# Patient Record
Sex: MALE | Race: WHITE | NOT HISPANIC OR LATINO | Employment: OTHER | URBAN - METROPOLITAN AREA
[De-identification: names, ages, dates, MRNs, and addresses within clinical notes are randomized per-mention and may not be internally consistent; named-entity substitution may affect disease eponyms.]

---

## 2023-01-01 ENCOUNTER — HOSPITAL ENCOUNTER (INPATIENT)
Facility: HOSPITAL | Age: 63
LOS: 9 days | Discharge: HOME HEALTH | DRG: 280 | End: 2023-11-08
Attending: EMERGENCY MEDICINE | Admitting: INTERNAL MEDICINE
Payer: MEDICARE

## 2023-01-01 DIAGNOSIS — J18.9 PNEUMONIA OF BOTH LUNGS DUE TO INFECTIOUS ORGANISM, UNSPECIFIED PART OF LUNG: ICD-10-CM

## 2023-01-01 DIAGNOSIS — I51.3 LV (LEFT VENTRICULAR) MURAL THROMBUS: ICD-10-CM

## 2023-01-01 DIAGNOSIS — I50.9 CONGESTIVE HEART FAILURE, UNSPECIFIED HF CHRONICITY, UNSPECIFIED HEART FAILURE TYPE: ICD-10-CM

## 2023-01-01 DIAGNOSIS — K40.90 LEFT INGUINAL HERNIA: ICD-10-CM

## 2023-01-01 DIAGNOSIS — J18.9 COMMUNITY ACQUIRED PNEUMONIA, UNSPECIFIED LATERALITY: ICD-10-CM

## 2023-01-01 DIAGNOSIS — R06.02 SOB (SHORTNESS OF BREATH): ICD-10-CM

## 2023-01-01 DIAGNOSIS — K62.5 RECTAL BLEED: Primary | ICD-10-CM

## 2023-01-01 DIAGNOSIS — R06.02 SHORTNESS OF BREATH: ICD-10-CM

## 2023-01-01 DIAGNOSIS — R09.02 HYPOXIA: ICD-10-CM

## 2023-01-01 LAB
ALBUMIN SERPL-MCNC: 3.1 G/DL (ref 3.4–4.8)
ALBUMIN/GLOB SERPL: 1 RATIO (ref 1.1–2)
ALP SERPL-CCNC: 136 UNIT/L (ref 40–150)
ALT SERPL-CCNC: 122 UNIT/L (ref 0–55)
AST SERPL-CCNC: 82 UNIT/L (ref 5–34)
BASOPHILS # BLD AUTO: 0.06 X10(3)/MCL
BASOPHILS NFR BLD AUTO: 0.4 %
BILIRUB SERPL-MCNC: 1.4 MG/DL
BNP BLD-MCNC: 3544.2 PG/ML
BUN SERPL-MCNC: 38.6 MG/DL (ref 8.4–25.7)
CALCIUM SERPL-MCNC: 8.4 MG/DL (ref 8.8–10)
CHLORIDE SERPL-SCNC: 108 MMOL/L (ref 98–107)
CO2 SERPL-SCNC: 24 MMOL/L (ref 23–31)
CREAT SERPL-MCNC: 1.05 MG/DL (ref 0.73–1.18)
EOSINOPHIL # BLD AUTO: 0.04 X10(3)/MCL (ref 0–0.9)
EOSINOPHIL NFR BLD AUTO: 0.3 %
ERYTHROCYTE [DISTWIDTH] IN BLOOD BY AUTOMATED COUNT: 15.7 % (ref 11.5–17)
FLUAV AG UPPER RESP QL IA.RAPID: NOT DETECTED
FLUBV AG UPPER RESP QL IA.RAPID: NOT DETECTED
GFR SERPLBLD CREATININE-BSD FMLA CKD-EPI: >60 MLS/MIN/1.73/M2
GLOBULIN SER-MCNC: 3 GM/DL (ref 2.4–3.5)
GLUCOSE SERPL-MCNC: 97 MG/DL (ref 82–115)
HCT VFR BLD AUTO: 47.6 % (ref 42–52)
HGB BLD-MCNC: 15 G/DL (ref 14–18)
IMM GRANULOCYTES # BLD AUTO: 0.2 X10(3)/MCL (ref 0–0.04)
IMM GRANULOCYTES NFR BLD AUTO: 1.3 %
LACTATE SERPL-SCNC: 1.5 MMOL/L (ref 0.5–2.2)
LYMPHOCYTES # BLD AUTO: 0.61 X10(3)/MCL (ref 0.6–4.6)
LYMPHOCYTES NFR BLD AUTO: 4 %
MCH RBC QN AUTO: 29.4 PG (ref 27–31)
MCHC RBC AUTO-ENTMCNC: 31.5 G/DL (ref 33–36)
MCV RBC AUTO: 93.3 FL (ref 80–94)
MONOCYTES # BLD AUTO: 0.85 X10(3)/MCL (ref 0.1–1.3)
MONOCYTES NFR BLD AUTO: 5.6 %
NEUTROPHILS # BLD AUTO: 13.51 X10(3)/MCL (ref 2.1–9.2)
NEUTROPHILS NFR BLD AUTO: 88.4 %
NRBC BLD AUTO-RTO: 0.1 %
PLATELET # BLD AUTO: 129 X10(3)/MCL (ref 130–400)
PMV BLD AUTO: 12 FL (ref 7.4–10.4)
POTASSIUM SERPL-SCNC: 4 MMOL/L (ref 3.5–5.1)
PROT SERPL-MCNC: 6.1 GM/DL (ref 5.8–7.6)
RBC # BLD AUTO: 5.1 X10(6)/MCL (ref 4.7–6.1)
RSV A 5' UTR RNA NPH QL NAA+PROBE: NOT DETECTED
SARS-COV-2 RNA RESP QL NAA+PROBE: NOT DETECTED
SODIUM SERPL-SCNC: 141 MMOL/L (ref 136–145)
TROPONIN I SERPL-MCNC: 0.05 NG/ML (ref 0–0.04)
TROPONIN I SERPL-MCNC: 0.09 NG/ML (ref 0–0.04)
WBC # SPEC AUTO: 15.27 X10(3)/MCL (ref 4.5–11.5)

## 2023-01-01 PROCEDURE — 85025 COMPLETE CBC W/AUTO DIFF WBC: CPT | Performed by: NURSE PRACTITIONER

## 2023-01-01 PROCEDURE — 63600175 PHARM REV CODE 636 W HCPCS: Performed by: EMERGENCY MEDICINE

## 2023-01-01 PROCEDURE — 84484 ASSAY OF TROPONIN QUANT: CPT | Performed by: INTERNAL MEDICINE

## 2023-01-01 PROCEDURE — 96365 THER/PROPH/DIAG IV INF INIT: CPT

## 2023-01-01 PROCEDURE — 83880 ASSAY OF NATRIURETIC PEPTIDE: CPT | Performed by: NURSE PRACTITIONER

## 2023-01-01 PROCEDURE — 63600175 PHARM REV CODE 636 W HCPCS: Performed by: INTERNAL MEDICINE

## 2023-01-01 PROCEDURE — 93005 ELECTROCARDIOGRAM TRACING: CPT

## 2023-01-01 PROCEDURE — 83605 ASSAY OF LACTIC ACID: CPT | Performed by: NURSE PRACTITIONER

## 2023-01-01 PROCEDURE — 0241U COVID/RSV/FLU A&B PCR: CPT | Performed by: EMERGENCY MEDICINE

## 2023-01-01 PROCEDURE — 25000003 PHARM REV CODE 250: Performed by: INTERNAL MEDICINE

## 2023-01-01 PROCEDURE — 25000003 PHARM REV CODE 250: Performed by: NURSE PRACTITIONER

## 2023-01-01 PROCEDURE — 93010 EKG 12-LEAD: ICD-10-PCS | Mod: ,,, | Performed by: STUDENT IN AN ORGANIZED HEALTH CARE EDUCATION/TRAINING PROGRAM

## 2023-01-01 PROCEDURE — 80053 COMPREHEN METABOLIC PANEL: CPT | Performed by: NURSE PRACTITIONER

## 2023-01-01 PROCEDURE — 11000001 HC ACUTE MED/SURG PRIVATE ROOM

## 2023-01-01 PROCEDURE — 93010 ELECTROCARDIOGRAM REPORT: CPT | Mod: ,,, | Performed by: STUDENT IN AN ORGANIZED HEALTH CARE EDUCATION/TRAINING PROGRAM

## 2023-01-01 PROCEDURE — 84484 ASSAY OF TROPONIN QUANT: CPT | Performed by: NURSE PRACTITIONER

## 2023-01-01 PROCEDURE — 96372 THER/PROPH/DIAG INJ SC/IM: CPT | Performed by: NURSE PRACTITIONER

## 2023-01-01 PROCEDURE — 87040 BLOOD CULTURE FOR BACTERIA: CPT | Performed by: NURSE PRACTITIONER

## 2023-01-01 PROCEDURE — 63600175 PHARM REV CODE 636 W HCPCS: Performed by: NURSE PRACTITIONER

## 2023-01-01 PROCEDURE — 99285 EMERGENCY DEPT VISIT HI MDM: CPT | Mod: 25

## 2023-01-01 PROCEDURE — 25000003 PHARM REV CODE 250: Performed by: EMERGENCY MEDICINE

## 2023-01-01 RX ORDER — SODIUM CHLORIDE 0.9 % (FLUSH) 0.9 %
10 SYRINGE (ML) INJECTION
Status: DISCONTINUED | OUTPATIENT
Start: 2023-01-01 | End: 2023-11-08 | Stop reason: HOSPADM

## 2023-01-01 RX ORDER — ACETAMINOPHEN 325 MG/1
650 TABLET ORAL EVERY 8 HOURS PRN
Status: DISCONTINUED | OUTPATIENT
Start: 2023-01-01 | End: 2023-11-08 | Stop reason: HOSPADM

## 2023-01-01 RX ORDER — TALC
6 POWDER (GRAM) TOPICAL NIGHTLY PRN
Status: DISCONTINUED | OUTPATIENT
Start: 2023-01-01 | End: 2023-11-08 | Stop reason: HOSPADM

## 2023-01-01 RX ORDER — ENOXAPARIN SODIUM 100 MG/ML
40 INJECTION SUBCUTANEOUS EVERY 24 HOURS
Status: DISCONTINUED | OUTPATIENT
Start: 2023-10-31 | End: 2023-10-31

## 2023-01-01 RX ORDER — ENOXAPARIN SODIUM 100 MG/ML
1 INJECTION SUBCUTANEOUS ONCE
Status: COMPLETED | OUTPATIENT
Start: 2023-01-01 | End: 2023-01-01

## 2023-01-01 RX ORDER — TALC
6 POWDER (GRAM) TOPICAL NIGHTLY PRN
Status: DISCONTINUED | OUTPATIENT
Start: 2023-01-01 | End: 2023-01-01

## 2023-01-01 RX ORDER — FUROSEMIDE 10 MG/ML
40 INJECTION INTRAMUSCULAR; INTRAVENOUS
Status: DISCONTINUED | OUTPATIENT
Start: 2023-10-31 | End: 2023-11-02

## 2023-01-01 RX ORDER — HYDROCODONE BITARTRATE AND ACETAMINOPHEN 10; 325 MG/1; MG/1
1 TABLET ORAL
Status: COMPLETED | OUTPATIENT
Start: 2023-01-01 | End: 2023-01-01

## 2023-01-01 RX ORDER — ASPIRIN 325 MG
325 TABLET, DELAYED RELEASE (ENTERIC COATED) ORAL ONCE
Status: COMPLETED | OUTPATIENT
Start: 2023-01-01 | End: 2023-01-01

## 2023-01-01 RX ORDER — IPRATROPIUM BROMIDE AND ALBUTEROL SULFATE 2.5; .5 MG/3ML; MG/3ML
3 SOLUTION RESPIRATORY (INHALATION) EVERY 4 HOURS PRN
Status: DISCONTINUED | OUTPATIENT
Start: 2023-10-31 | End: 2023-11-08 | Stop reason: HOSPADM

## 2023-01-01 RX ADMIN — ASPIRIN 325 MG: 325 TABLET, COATED ORAL at 06:10

## 2023-01-01 RX ADMIN — ENOXAPARIN SODIUM 70 MG: 80 INJECTION SUBCUTANEOUS at 06:10

## 2023-01-01 RX ADMIN — AZITHROMYCIN MONOHYDRATE 500 MG: 500 INJECTION, POWDER, LYOPHILIZED, FOR SOLUTION INTRAVENOUS at 09:10

## 2023-01-01 RX ADMIN — HYDROCODONE BITARTRATE AND ACETAMINOPHEN 1 TABLET: 10; 325 TABLET ORAL at 06:10

## 2023-01-01 RX ADMIN — PIPERACILLIN AND TAZOBACTAM 4.5 G: 4; .5 INJECTION, POWDER, LYOPHILIZED, FOR SOLUTION INTRAVENOUS; PARENTERAL at 05:10

## 2023-10-30 PROBLEM — J18.9 CAP (COMMUNITY ACQUIRED PNEUMONIA): Status: ACTIVE | Noted: 2023-01-01

## 2023-10-30 NOTE — ED PROVIDER NOTES
Encounter Date: 10/30/2023    SCRIBE #1 NOTE: I, Jess Solano, am scribing for, and in the presence of,  Campbell Wilkins MD. I have scribed the following portions of the note - Other sections scribed: HPI, ROS, PE.       History     Chief Complaint   Patient presents with    Shortness of Breath     AASi with SOB, weakness. Recent admission for bilat pneumonia and COPD exacerbation. Left AMA      63 year old male with history of COPD, MI, and lung cancer presents to the ED via EMS for SOB. Per EMS, pt was admitted to a hospital in [place] for pneumonia and left AMA. Pt reports that he was still feeling bad when he left 2 days ago. He complains of groin pain due to a hernia and a cough. He notes that he is on oxygen at home.     The history is provided by the patient and the EMS personnel. No  was used.     Review of patient's allergies indicates:  Not on File  History reviewed. No pertinent past medical history.  History reviewed. No pertinent surgical history.  History reviewed. No pertinent family history.     Review of Systems   Respiratory:  Positive for cough and shortness of breath.    Musculoskeletal:         Groin pain       Physical Exam     Initial Vitals [10/30/23 1400]   BP Pulse Resp Temp SpO2   (!) 158/108 (!) 113 (!) 23 97.5 °F (36.4 °C) 95 %      MAP       --         Physical Exam    Nursing note and vitals reviewed.  Constitutional: He appears distressed (moderately).   Ill appearing    HENT:   Head: Normocephalic and atraumatic.   Right Ear: Tympanic membrane normal.   Left Ear: Tympanic membrane normal.   Mouth/Throat: Oropharynx is clear and moist.   Eyes: Conjunctivae and EOM are normal. Pupils are equal, round, and reactive to light.   Neck: Trachea normal. Neck supple. Carotid bruit is not present. No JVD present.   Normal range of motion.  Cardiovascular:  Normal rate and regular rhythm.           No murmur heard.  Pulmonary/Chest: No respiratory distress. He exhibits  no tenderness.   Bilateral crackles in all fields    Abdominal: Abdomen is soft. Bowel sounds are normal. He exhibits no distension.   Soft, mildly tender left inguinal hernia   Colostomy right abdominal   Musculoskeletal:         General: Edema (1+ LE bilaterally) present. Normal range of motion.      Cervical back: Normal range of motion and neck supple.      Lumbar back: Normal. No tenderness. Normal range of motion.     Neurological: He is alert and oriented to person, place, and time. He has normal strength. No cranial nerve deficit or sensory deficit.   Psychiatric: He has a normal mood and affect. Judgment normal.         ED Course   Procedures  Labs Reviewed   COMPREHENSIVE METABOLIC PANEL - Abnormal; Notable for the following components:       Result Value    Chloride 108 (*)     Blood Urea Nitrogen 38.6 (*)     Calcium Level Total 8.4 (*)     Albumin Level 3.1 (*)     Albumin/Globulin Ratio 1.0 (*)     Alanine Aminotransferase 122 (*)     Aspartate Aminotransferase 82 (*)     All other components within normal limits   B-TYPE NATRIURETIC PEPTIDE - Abnormal; Notable for the following components:    Natriuretic Peptide 3,544.2 (*)     All other components within normal limits   TROPONIN I - Abnormal; Notable for the following components:    Troponin-I 0.085 (*)     All other components within normal limits   CBC WITH DIFFERENTIAL - Abnormal; Notable for the following components:    WBC 15.27 (*)     MCHC 31.5 (*)     Platelet 129 (*)     MPV 12.0 (*)     Neut # 13.51 (*)     IG# 0.20 (*)     All other components within normal limits   LACTIC ACID, PLASMA - Normal   BLOOD CULTURE [facility]   BLOOD CULTURE [facility]   CBC W/ AUTO DIFFERENTIAL    Narrative:     The following orders were created for panel order CBC Auto Differential.  Procedure                               Abnormality         Status                     ---------                               -----------         ------                     CBC with  Differential[[id number]]       Abnormal            Final result                 Please view results for these tests on the individual orders.   COVID/RSV/FLU A&B PCR     EKG Readings: (Independently Interpreted)   Initial Reading: No STEMI. Rhythm: Sinus Tachycardia. Heart Rate: 110. Ectopy: No Ectopy. Conduction: Normal. T Waves: Normal. Axis: Left Axis Deviation. Clinical Impression: Sinus Tachycardia and Left Ventricular Hypertrophy (LDH) Other Impression: nonspecific ST changes   EKG performed at 14:10 on 10/30/23       Imaging Results              X-Ray Chest PA And Lateral (Final result)  Result time 10/30/23 16:52:57      Final result by Azeem Stanley MD (10/30/23 16:52:57)                   Narrative:    EXAMINATION  XR CHEST PA AND LATERAL    CLINICAL HISTORY  Shortness of breath;    TECHNIQUE  A total of 2 images submitted of the chest.    COMPARISON  None available at the time of initial interpretation.    FINDINGS  Lines/tubes/devices: Right chest wall subcutaneous port is present, catheter terminating at the upper to mid SVC region.  Single lead left chest wall pacemaker/ICD also noted.  Multiple ECG leads overlie the chest.    The cardiomediastinal silhouette and central pulmonary vasculature are unremarkable contour and size.  The trachea is midline.  Ill-defined hazy opacification of the bilateral mid and lower lung zones is appreciated, with no organized lobar consolidation identified.  Small right pleural effusion is suspected.  There is no convincing pneumothorax.    There is no acute osseous or extrathoracic abnormality.    IMPRESSION  1. Ill-defined bilateral mid/lower zone hazy infiltrates, developing atypical infectious process not excluded.  2. Suspected small right pleural effusion.      Electronically signed by: Azeem Stanley  Date:    10/30/2023  Time:    16:52                                     Medications   HYDROcodone-acetaminophen  mg per tablet 1 tablet (has no administration  in time range)   piperacillin-tazobactam (ZOSYN) 4.5 g in dextrose 5 % in water (D5W) 100 mL IVPB (MB+) (4.5 g Intravenous New Bag 10/30/23 1700)     Medical Decision Making  Differential diagnosis includes but is not limited to pneumonia, COPD, sepsis, CHF, inguinal hernia, incarcerated inguinal hernia       Amount and/or Complexity of Data Reviewed  Independent Historian: EMS     Details: Per EMS, pt was admitted to a hospital in [place] for pneumonia and left AMA.  External Data Reviewed: labs and notes.     Details: Please see prior notes from [facility] as described in my notes    Patient had elevated lactic acids, mildly elevated white blood cell count 59115, negative viral swabs, elevated BNP and troponin with a previous hospitalization this prior  Labs: ordered.     Details: Elevated white blood cell count 03220, BNP of 3000, elevated troponin  Radiology: ordered. Decision-making details documented in ED Course.  ECG/medicine tests: ordered and independent interpretation performed. Decision-making details documented in ED Course.  Discussion of management or test interpretation with external provider(s): Discussed with Cecilia with hospitalist and will admit    Risk  Prescription drug management.  Decision regarding hospitalization.              Attending Attestation:           Physician Attestation for Scribe:  Physician Attestation Statement for Scribe #1: I, Campbell Wilkins MD, reviewed documentation, as scribed by Jess Solano in my presence, and it is both accurate and complete.             ED Course as of 10/30/23 1732   Mon Oct 30, 2023   1656 She recently another facility AMA.  No fever, mild leukocytosis as well.  Patient however with significantly elevated BNP and troponin.  Review of old chart shows that the patient has a terrible cardiomyopathy, with EF less than 20% looking at outside notes. [MP]   1726 Received old records from [place].  Patient with productive green sputum for  several days, COVID swabs flu swabs negative.  White count was 19485 at the time.  Had elevated troponin and BNP at that time as well [MP]      ED Course User Index  [MP] Campbell Wilkins MD                    Clinical Impression:   Final diagnoses:  [R06.02] Shortness of breath  [J18.9] Pneumonia of both lungs due to infectious organism, unspecified part of lung (Primary)  [I50.9] Congestive heart failure, unspecified HF chronicity, unspecified heart failure type  [R09.02] Hypoxia  [K40.90] Left inguinal hernia        ED Disposition Condition    Admit Stable                Campbell Wilkins MD  10/30/23 1731       Campbell Wilkins MD  10/30/23 1732

## 2023-10-30 NOTE — FIRST PROVIDER EVALUATION
Medical screening examination initiated.  I have conducted a focused provider triage encounter, findings are as follows:    Brief history of present illness:  Patient states SOB. Patient was recently admitted for pneumonia, COPD, and sepsis but he left AMA.    Vitals:    10/30/23 1400   BP: (!) 158/108   Pulse: (!) 113   Resp: (!) 23   Temp: 97.5 °F (36.4 °C)   TempSrc: Oral   SpO2: 95%       Pertinent physical exam:  Awake, alert    Brief workup plan:  Labs, EKG, Imaging    Preliminary workup initiated; this workup will be continued and followed by the physician or advanced practice provider that is assigned to the patient when roomed.

## 2023-10-31 LAB
ALBUMIN SERPL-MCNC: 2.8 G/DL (ref 3.4–4.8)
ALBUMIN/GLOB SERPL: 0.9 RATIO (ref 1.1–2)
ALP SERPL-CCNC: 114 UNIT/L (ref 40–150)
ALT SERPL-CCNC: 99 UNIT/L (ref 0–55)
APTT PPP: 29.7 SECONDS (ref 23.2–33.7)
AST SERPL-CCNC: 62 UNIT/L (ref 5–34)
AV INDEX (PROSTH): 0.66
AV MEAN GRADIENT: 2 MMHG
AV PEAK GRADIENT: 4 MMHG
AV VALVE AREA BY VELOCITY RATIO: 2.05 CM²
AV VALVE AREA: 1.88 CM²
AV VELOCITY RATIO: 0.72
BASOPHILS # BLD AUTO: 0.05 X10(3)/MCL
BASOPHILS # BLD AUTO: 0.07 X10(3)/MCL
BASOPHILS NFR BLD AUTO: 0.3 %
BASOPHILS NFR BLD AUTO: 0.4 %
BILIRUB SERPL-MCNC: 1.3 MG/DL
BSA FOR ECHO PROCEDURE: 1.82 M2
BUN SERPL-MCNC: 33.1 MG/DL (ref 8.4–25.7)
CALCIUM SERPL-MCNC: 8.6 MG/DL (ref 8.8–10)
CHLORIDE SERPL-SCNC: 107 MMOL/L (ref 98–107)
CO2 SERPL-SCNC: 28 MMOL/L (ref 23–31)
CREAT SERPL-MCNC: 1.1 MG/DL (ref 0.73–1.18)
CV ECHO LV RWT: 0.28 CM
DOP CALC AO PEAK VEL: 0.97 M/S
DOP CALC AO VTI: 14.6 CM
DOP CALC LVOT AREA: 2.8 CM2
DOP CALC LVOT DIAMETER: 1.9 CM
DOP CALC LVOT PEAK VEL: 0.7 M/S
DOP CALC LVOT STROKE VOLUME: 27.49 CM3
DOP CALC MV VTI: 11.1 CM
DOP CALCLVOT PEAK VEL VTI: 9.7 CM
E WAVE DECELERATION TIME: 119 MSEC
E/A RATIO: 1.03
E/E' RATIO: 13.6 M/S
ECHO LV POSTERIOR WALL: 0.89 CM (ref 0.6–1.1)
EOSINOPHIL # BLD AUTO: 0.09 X10(3)/MCL (ref 0–0.9)
EOSINOPHIL # BLD AUTO: 0.16 X10(3)/MCL (ref 0–0.9)
EOSINOPHIL NFR BLD AUTO: 0.5 %
EOSINOPHIL NFR BLD AUTO: 1 %
ERYTHROCYTE [DISTWIDTH] IN BLOOD BY AUTOMATED COUNT: 15.6 % (ref 11.5–17)
ERYTHROCYTE [DISTWIDTH] IN BLOOD BY AUTOMATED COUNT: 15.8 % (ref 11.5–17)
FRACTIONAL SHORTENING: 14 % (ref 28–44)
GFR SERPLBLD CREATININE-BSD FMLA CKD-EPI: >60 MLS/MIN/1.73/M2
GLOBULIN SER-MCNC: 3.2 GM/DL (ref 2.4–3.5)
GLUCOSE SERPL-MCNC: 103 MG/DL (ref 82–115)
HCT VFR BLD AUTO: 48 % (ref 42–52)
HCT VFR BLD AUTO: 49.4 % (ref 42–52)
HGB BLD-MCNC: 15.4 G/DL (ref 14–18)
HGB BLD-MCNC: 15.6 G/DL (ref 14–18)
IMM GRANULOCYTES # BLD AUTO: 0.16 X10(3)/MCL (ref 0–0.04)
IMM GRANULOCYTES # BLD AUTO: 0.18 X10(3)/MCL (ref 0–0.04)
IMM GRANULOCYTES NFR BLD AUTO: 1 %
IMM GRANULOCYTES NFR BLD AUTO: 1.1 %
INR PPP: 1.2
INTERVENTRICULAR SEPTUM: 0.81 CM (ref 0.6–1.1)
LEFT ATRIUM SIZE: 4 CM
LEFT ATRIUM VOLUME INDEX MOD: 45.2 ML/M2
LEFT ATRIUM VOLUME MOD: 82.8 CM3
LEFT INTERNAL DIMENSION IN SYSTOLE: 5.57 CM (ref 2.1–4)
LEFT VENTRICLE DIASTOLIC VOLUME INDEX: 115.3 ML/M2
LEFT VENTRICLE DIASTOLIC VOLUME: 211 ML
LEFT VENTRICLE MASS INDEX: 124 G/M2
LEFT VENTRICLE SYSTOLIC VOLUME INDEX: 83.1 ML/M2
LEFT VENTRICLE SYSTOLIC VOLUME: 152 ML
LEFT VENTRICULAR INTERNAL DIMENSION IN DIASTOLE: 6.44 CM (ref 3.5–6)
LEFT VENTRICULAR MASS: 227.12 G
LV LATERAL E/E' RATIO: 11.33 M/S
LV SEPTAL E/E' RATIO: 17 M/S
LVOT MG: 1 MMHG
LVOT MV: 0.44 CM/S
LYMPHOCYTES # BLD AUTO: 0.76 X10(3)/MCL (ref 0.6–4.6)
LYMPHOCYTES # BLD AUTO: 1.03 X10(3)/MCL (ref 0.6–4.6)
LYMPHOCYTES NFR BLD AUTO: 4.6 %
LYMPHOCYTES NFR BLD AUTO: 6.1 %
MCH RBC QN AUTO: 29.5 PG (ref 27–31)
MCH RBC QN AUTO: 29.7 PG (ref 27–31)
MCHC RBC AUTO-ENTMCNC: 31.6 G/DL (ref 33–36)
MCHC RBC AUTO-ENTMCNC: 32.1 G/DL (ref 33–36)
MCV RBC AUTO: 92.5 FL (ref 80–94)
MCV RBC AUTO: 93.4 FL (ref 80–94)
MONOCYTES # BLD AUTO: 1.06 X10(3)/MCL (ref 0.1–1.3)
MONOCYTES # BLD AUTO: 1.07 X10(3)/MCL (ref 0.1–1.3)
MONOCYTES NFR BLD AUTO: 6.4 %
MONOCYTES NFR BLD AUTO: 6.5 %
MRSA PCR SCRN (OHS): NOT DETECTED
MV MEAN GRADIENT: 2 MMHG
MV PEAK A VEL: 0.66 M/S
MV PEAK E VEL: 0.68 M/S
MV PEAK GRADIENT: 3 MMHG
MV VALVE AREA BY CONTINUITY EQUATION: 2.48 CM2
NEUTROPHILS # BLD AUTO: 14.24 X10(3)/MCL (ref 2.1–9.2)
NEUTROPHILS # BLD AUTO: 14.3 X10(3)/MCL (ref 2.1–9.2)
NEUTROPHILS NFR BLD AUTO: 85.1 %
NEUTROPHILS NFR BLD AUTO: 87 %
NRBC BLD AUTO-RTO: 0 %
NRBC BLD AUTO-RTO: 0.1 %
OHS LV EJECTION FRACTION SIMPSONS BIPLANE MOD: 17 %
PISA TR MAX VEL: 3.25 M/S
PLATELET # BLD AUTO: 136 X10(3)/MCL (ref 130–400)
PLATELET # BLD AUTO: 136 X10(3)/MCL (ref 130–400)
PMV BLD AUTO: 11.8 FL (ref 7.4–10.4)
PMV BLD AUTO: 12 FL (ref 7.4–10.4)
POTASSIUM SERPL-SCNC: 4.3 MMOL/L (ref 3.5–5.1)
PROT SERPL-MCNC: 6 GM/DL (ref 5.8–7.6)
PROTHROMBIN TIME: 15.5 SECONDS (ref 12.5–14.5)
RA MAJOR: 4.76 CM
RA WIDTH: 4.54 CM
RBC # BLD AUTO: 5.19 X10(6)/MCL (ref 4.7–6.1)
RBC # BLD AUTO: 5.29 X10(6)/MCL (ref 4.7–6.1)
RIGHT VENTRICULAR END-DIASTOLIC DIMENSION: 3.35 CM
RV MID DIAMA: 4.25 CM
SODIUM SERPL-SCNC: 144 MMOL/L (ref 136–145)
TDI LATERAL: 0.06 M/S
TDI SEPTAL: 0.04 M/S
TDI: 0.05 M/S
TR MAX PG: 42 MMHG
TRICUSPID ANNULAR PLANE SYSTOLIC EXCURSION: 1.74 CM
TROPONIN I SERPL-MCNC: 0.05 NG/ML (ref 0–0.04)
WBC # SPEC AUTO: 16.38 X10(3)/MCL (ref 4.5–11.5)
WBC # SPEC AUTO: 16.79 X10(3)/MCL (ref 4.5–11.5)
Z-SCORE OF LEFT VENTRICULAR DIMENSION IN END DIASTOLE: 2.39
Z-SCORE OF LEFT VENTRICULAR DIMENSION IN END SYSTOLE: 4.55

## 2023-10-31 PROCEDURE — 25000003 PHARM REV CODE 250: Performed by: INTERNAL MEDICINE

## 2023-10-31 PROCEDURE — 63600175 PHARM REV CODE 636 W HCPCS: Performed by: INTERNAL MEDICINE

## 2023-10-31 PROCEDURE — 85025 COMPLETE CBC W/AUTO DIFF WBC: CPT

## 2023-10-31 PROCEDURE — 85730 THROMBOPLASTIN TIME PARTIAL: CPT | Performed by: INTERNAL MEDICINE

## 2023-10-31 PROCEDURE — 21400001 HC TELEMETRY ROOM

## 2023-10-31 PROCEDURE — 84484 ASSAY OF TROPONIN QUANT: CPT | Performed by: INTERNAL MEDICINE

## 2023-10-31 PROCEDURE — 87641 MR-STAPH DNA AMP PROBE: CPT | Performed by: INTERNAL MEDICINE

## 2023-10-31 PROCEDURE — 85025 COMPLETE CBC W/AUTO DIFF WBC: CPT | Performed by: NURSE PRACTITIONER

## 2023-10-31 PROCEDURE — 80053 COMPREHEN METABOLIC PANEL: CPT | Performed by: NURSE PRACTITIONER

## 2023-10-31 PROCEDURE — 85610 PROTHROMBIN TIME: CPT

## 2023-10-31 PROCEDURE — 85730 THROMBOPLASTIN TIME PARTIAL: CPT

## 2023-10-31 PROCEDURE — 25000003 PHARM REV CODE 250: Performed by: HOSPITALIST

## 2023-10-31 PROCEDURE — 87070 CULTURE OTHR SPECIMN AEROBIC: CPT | Performed by: INTERNAL MEDICINE

## 2023-10-31 PROCEDURE — 63600175 PHARM REV CODE 636 W HCPCS

## 2023-10-31 RX ORDER — HEPARIN SODIUM,PORCINE/D5W 25000/250
0-40 INTRAVENOUS SOLUTION INTRAVENOUS CONTINUOUS
Status: DISCONTINUED | OUTPATIENT
Start: 2023-10-31 | End: 2023-11-01

## 2023-10-31 RX ORDER — HYDROCODONE BITARTRATE AND ACETAMINOPHEN 10; 325 MG/1; MG/1
1 TABLET ORAL EVERY 6 HOURS PRN
Status: DISCONTINUED | OUTPATIENT
Start: 2023-10-31 | End: 2023-11-08 | Stop reason: HOSPADM

## 2023-10-31 RX ADMIN — PIPERACILLIN AND TAZOBACTAM 4.5 G: 4; .5 INJECTION, POWDER, LYOPHILIZED, FOR SOLUTION INTRAVENOUS; PARENTERAL at 09:10

## 2023-10-31 RX ADMIN — FUROSEMIDE 40 MG: 10 INJECTION, SOLUTION INTRAMUSCULAR; INTRAVENOUS at 12:10

## 2023-10-31 RX ADMIN — HYDROCODONE BITARTRATE AND ACETAMINOPHEN 1 TABLET: 10; 325 TABLET ORAL at 05:10

## 2023-10-31 RX ADMIN — ACETAMINOPHEN 325MG 650 MG: 325 TABLET ORAL at 10:10

## 2023-10-31 RX ADMIN — AZITHROMYCIN MONOHYDRATE 500 MG: 500 INJECTION, POWDER, LYOPHILIZED, FOR SOLUTION INTRAVENOUS at 10:10

## 2023-10-31 RX ADMIN — HYDROCODONE BITARTRATE AND ACETAMINOPHEN 1 TABLET: 10; 325 TABLET ORAL at 10:10

## 2023-10-31 RX ADMIN — PIPERACILLIN AND TAZOBACTAM 4.5 G: 4; .5 INJECTION, POWDER, LYOPHILIZED, FOR SOLUTION INTRAVENOUS; PARENTERAL at 12:10

## 2023-10-31 RX ADMIN — HEPARIN SODIUM 18 UNITS/KG/HR: 10000 INJECTION, SOLUTION INTRAVENOUS at 04:10

## 2023-10-31 RX ADMIN — PIPERACILLIN AND TAZOBACTAM 4.5 G: 4; .5 INJECTION, POWDER, LYOPHILIZED, FOR SOLUTION INTRAVENOUS; PARENTERAL at 05:10

## 2023-10-31 RX ADMIN — ACETAMINOPHEN 650 MG: 325 TABLET, FILM COATED ORAL at 03:10

## 2023-10-31 NOTE — PROGRESS NOTES
[facility] Hospital Medicine Progress Note        Chief Complaint: Inpatient Follow-up     HPI:   63-year-old male with a history of CHF with EF less than 20% status post ICD, CAD status post PCI 2022, essential hypertension, COPD, lung cancer status post XRT, colon cancer status post colectomy who was recently admitted at [facility] for sepsis from community-acquired pneumonia but reported left 2 days ago against medical advice.  He presents to the ER tonight complaining of persistent dyspnea, cough with purulent green mucus production.  He also complains of left groin pain, has a chronic left groin hernia, but states it easily reducible and he has been told he is not a surgical candidate due to his comorbidities.     He arrived afebrile but tachycardic and mildly tachypneic, hemodynamically stable and requiring 4 L nasal cannula to maintain adequate sats.  Laboratory work showed leukocytosis of 15 K, mild transaminitis, a BNP of 3500 and a mildly elevated troponin of 0.085.  Influenza and COVID testing was negative.  Chest x-ray showed ill-defined bilateral mid/lower zone hazy infiltrates concerning for developing atypical process.    Interval Hx:  Patient patient is somnolent but arousable.  No family with him at bedside.  Vitals are stable.  Currently on 3 L nasal cannula.  Still little tachycardic.  Put out about 2 L of urine with diuretics.  Net negative 1.6    Objective/physical exam:  General: In no acute distress, afebrile  Chest: Clear to auscultation bilaterally  Heart: RRR, +S1, S2, no appreciable murmur  Abdomen: Soft, nontender, BS +  MSK: Warm, no lower extremity edema, no clubbing or cyanosis  Neurologic: Alert and oriented x4, Cranial nerve II-XII intact, Strength 5/5 in all 4 extremities    VITAL SIGNS: 24 HRS MIN & MAX LAST   Temp  Min: 97.5 °F (36.4 °C)  Max: 97.5 °F (36.4 °C) 97.5 °F (36.4 °C)   BP  Min: 107/71  Max: 158/108 131/87   Pulse  Min: 80  Max:  113  102   Resp  Min: 15  Max: 24 15   SpO2  Min: 94 %  Max: 100 % 96 %       Recent Labs   Lab 10/30/23  1514 10/31/23  0244   WBC 15.27* 16.38*   RBC 5.10 5.19   HGB 15.0 15.4   HCT 47.6 48.0   MCV 93.3 92.5   MCH 29.4 29.7   MCHC 31.5* 32.1*   RDW 15.7 15.6   * 136   MPV 12.0* 12.0*       Recent Labs   Lab 10/30/23  1514 10/31/23  0244    144   K 4.0 4.3   CO2 24 28   BUN 38.6* 33.1*   CREATININE 1.05 1.10   CALCIUM 8.4* 8.6*   ALBUMIN 3.1* 2.8*   ALKPHOS 136 114   * 99*   AST 82* 62*   BILITOT 1.4 1.3          Microbiology Results (last 7 days)       Procedure Component Value Units Date/Time    Respiratory Culture [[id number]] Collected: 10/31/23 0626    Order Status: Sent Specimen: Sputum, Expectorated Updated: 10/31/23 0629    Blood Culture [[id number]] Collected: 10/30/23 1514    Order Status: Resulted Specimen: Blood Updated: 10/30/23 1519    Blood Culture [[id number]] Collected: 10/30/23 1514    Order Status: Resulted Specimen: Blood Updated: 10/30/23 1519             Radiology:  X-Ray Chest PA And Lateral  EXAMINATION  XR CHEST PA AND LATERAL    CLINICAL HISTORY  Shortness of breath;    TECHNIQUE  A total of 2 images submitted of the chest.    COMPARISON  None available at the time of initial interpretation.    FINDINGS  Lines/tubes/devices: Right chest wall subcutaneous port is present, catheter terminating at the upper to mid SVC region.  Single lead left chest wall pacemaker/ICD also noted.  Multiple ECG leads overlie the chest.    The cardiomediastinal silhouette and central pulmonary vasculature are unremarkable contour and size.  The trachea is midline.  Ill-defined hazy opacification of the bilateral mid and lower lung zones is appreciated, with no organized lobar consolidation identified.  Small right pleural effusion is suspected.  There is no convincing pneumothorax.    There is no acute osseous or extrathoracic abnormality.    IMPRESSION  1. Ill-defined bilateral mid/lower  zone hazy infiltrates, developing atypical infectious process not excluded.  2. Suspected small right pleural effusion.    Electronically signed by: Azeem Stanley  Date:    10/30/2023  Time:    16:52      Scheduled Med:   azithromycin  500 mg Intravenous Q24H    enoxparin  40 mg Subcutaneous Daily    furosemide (LASIX) injection  40 mg Intravenous Q12H    piperacillin-tazobactam (Zosyn) IV (PEDS and ADULTS) (extended infusion is not appropriate)  4.5 g Intravenous Q8H        Continuous Infusions:       PRN Meds:  acetaminophen, acetaminophen, albuterol-ipratropium, melatonin, sodium chloride 0.9%, sodium chloride 0.9%       Assessment/Plan:   Multifactorial acute hypoxemic respiratory failure  Community-acquired pneumonia contributing to above  Decompensated systolic congestive CHF contributing to above  NSTEMI/type 2 demand ischemia 2/2 above  Left groin inguinal hernia  CHF, documented LVEF less than 20%, 10/2022 s/p ICD   CAD s/p PCI 10/2022  Essential hypertension   COPD, not on home oxygen   Lung cancer  Colon cancer, status post colectomy    Patient's hypoxemia likely secondary to heart failure exacerbation and community-acquired pneumonia.    Will continue with aggressive diuresis with Lasix.  He is had good urine output but still has peripheral edema.  Continue with 40 mg b.i.d. for now.    Continue to monitor his troponins.  No signs of acute coronary syndrome.  Likely some demand ischemia from heart failure and pneumonia.    Will continue with Zosyn for now.  Leukocytosis essentially unchanged from yesterday.  Remains afebrile.  Follow up blood cultures.    At baseline the patient does have COPD but does not require home O2.  Will continue try to wean his oxygen as tolerated.  Blood pressure stable.    Critical care diagnosis: acute systolic heart failure requiring IV diuretics  Critical care interventions: hands on evaluation, review of labs/radiographs/records and discussions with family  Critical care  time spent: >32 minutes        David Goldberg MD   10/31/2023     All diagnosis and differential diagnosis have been reviewed; assessment and plan has been documented; I have personally reviewed the labs and test results that are presently available; I have reviewed the patients medication list; I have reviewed the consulting providers response and recommendations. I have reviewed or attempted to review medical records based upon their availability    All of the patient's questions have been  addressed and answered. Patient's is agreeable to the above stated plan. I will continue to monitor closely and make adjustments to medical management as needed.  _____________________________________________________________________

## 2023-10-31 NOTE — CONSULTS
[facility] - Emergency Dept    Cardiology  Consult Note    Patient Name: Ye Vergara  MRN: [id number]  Admission Date: 10/30/2023  Hospital Length of Stay: 1 days  Code Status: Full Code   Attending Provider: Spencer Pratt MD   Consulting Provider: Alexia Montenegro MD  Primary Care Physician: Aaron Weeks MD  Principal Problem:CAP (community acquired pneumonia)    Patient information was obtained from patient, ER records, and primary team.     Subjective:     Chief Complaint:  dyspnea      HPI: Mr. Vergara is a 62 y/o male who initially presented to the ED 10/30/2023 with complaints of dyspnea on exertion and productive cough with green sputum.  Per chart review it appears patient was recently admitted at [facility] for sepsis from CAP but left AMA 2 days ago.  Lab work on admission was notable for leukocytosis with WBC 15, BNP 3500, elevated trop of 0.085.  CXR performed showing ill-defined bilateral mid/lower zone hazy infiltrates concerning for developing atypical process.  Patient has known history of HF with EF 20%.  Patient was given lasix 40mg BID with good urine output.  Cardiology was consulted in setting of CHF exacerbation and NSTEMI.      PMH: CHF w/ EF20% s/p ICD (March 2023), CAD s/p PCI 10/2022, HTN, HLD, colon cancer s/p colectomy, inguinal hernia, lung cancer s/p XRT (>10 years ago), COPD, hx of prostate cancer   PSH: colectomy, ICD placement  Family History: non-contributory   Social History: denies tobacco, alcohol, or recreational drug use.     Previous Cardiac Diagnostics:   NM Myocardial perfusion spect 10/9/2018  . No evidence of stress-induced ischemia.   2. Normal left ventricular systolic function with a left ventricular ejection fraction of 62%.    TTE 10/24/2022:  Left Ventricle: Left ventricle cavity appears normal. There is   hypertrophy. Systolic function is severely decreased with an ejection   fraction less than 20%. Wall motion is abnormal. Unable to  assess   diastolic function.     Left Atrium: Left atrium size is normal.     Mitral Valve: Mildly thickened leaflets. No restricted motion. Moderate   transvalvular regurgitation. No stenosis. Tricuspid Valve: Valve structure   is normal. No restricted motion. Mild to moderate transvalvular   regurgitation. No stenosis.     Pericardium: Trivial pericardial effusion present. No indication of   cardiac tamponade     Cardiac catheterization 10/26/2022  Findings:   Hemodynamics:    LVEDP 33    Coronary findings:   Coronary dominance: right   LM   Li   LAD   Mild diffuse diseae   CFX  Mild diffuse disease   RCA  Patent mid stent . Moderate diffuse disease    History reviewed. No pertinent past medical history.    History reviewed. No pertinent surgical history.    Review of patient's allergies indicates:  No Known Allergies    No current facility-administered medications on file prior to encounter.     No current outpatient medications on file prior to encounter.     Family History    None       Tobacco Use    Smoking status: Not on file    Smokeless tobacco: Not on file   Substance and Sexual Activity    Alcohol use: Not on file    Drug use: Not on file    Sexual activity: Not on file       Review of Systems   Constitutional:  Negative for chills and fever.   Respiratory:  Positive for chest tightness and shortness of breath.    Gastrointestinal:  Positive for abdominal pain and nausea.   Genitourinary:  Positive for scrotal swelling.       Objective:     Vital Signs (Most Recent):  Temp: 97.5 °F (36.4 °C) (10/30/23 1400)  Pulse: 109 (10/31/23 0732)  Resp: 18 (10/31/23 0732)  BP: 112/67 (10/31/23 0732)  SpO2: (!) 94 % (10/31/23 0748) Vital Signs (24h Range):  Temp:  [97.5 °F (36.4 °C)] 97.5 °F (36.4 °C)  Pulse:  [] 109  Resp:  [15-24] 18  SpO2:  [83 %-100 %] 94 %  BP: (107-158)/() 112/67     Weight: 68.5 kg (151 lb)  Body mass index is 22.3 kg/m².    SpO2: (!) 94 %         Intake/Output Summary  (Last 24 hours) at 10/31/2023 1020  Last data filed at 10/31/2023 0751  Gross per 24 hour   Intake 300 ml   Output 2500 ml   Net -2200 ml       Lines/Drains/Airways       Drain  Duration                  Colostomy RLQ -- days              Peripheral Intravenous Line  Duration                  Peripheral IV - Single Lumen 10/31/23 0747 20 G Left Antecubital <1 day                    Significant Labs:  Recent Results (from the past 72 hour(s))   Comprehensive Metabolic Panel    Collection Time: 10/30/23  3:14 PM   Result Value Ref Range    Sodium Level 141 136 - 145 mmol/L    Potassium Level 4.0 3.5 - 5.1 mmol/L    Chloride 108 (H) 98 - 107 mmol/L    Carbon Dioxide 24 23 - 31 mmol/L    Glucose Level 97 82 - 115 mg/dL    Blood Urea Nitrogen 38.6 (H) 8.4 - 25.7 mg/dL    Creatinine 1.05 0.73 - 1.18 mg/dL    Calcium Level Total 8.4 (L) 8.8 - 10.0 mg/dL    Protein Total 6.1 5.8 - 7.6 gm/dL    Albumin Level 3.1 (L) 3.4 - 4.8 g/dL    Globulin 3.0 2.4 - 3.5 gm/dL    Albumin/Globulin Ratio 1.0 (L) 1.1 - 2.0 ratio    Bilirubin Total 1.4 <=1.5 mg/dL    Alkaline Phosphatase 136 40 - 150 unit/L    Alanine Aminotransferase 122 (H) 0 - 55 unit/L    Aspartate Aminotransferase 82 (H) 5 - 34 unit/L    eGFR >60 mls/min/1.73/m2   BNP    Collection Time: 10/30/23  3:14 PM   Result Value Ref Range    Natriuretic Peptide 3,544.2 (H) <=100.0 pg/mL   Troponin I    Collection Time: 10/30/23  3:14 PM   Result Value Ref Range    Troponin-I 0.085 (H) 0.000 - 0.045 ng/mL   Lactic Acid, Plasma    Collection Time: 10/30/23  3:14 PM   Result Value Ref Range    Lactic Acid Level 1.5 0.5 - 2.2 mmol/L   CBC with Differential    Collection Time: 10/30/23  3:14 PM   Result Value Ref Range    WBC 15.27 (H) 4.50 - 11.50 x10(3)/mcL    RBC 5.10 4.70 - 6.10 x10(6)/mcL    Hgb 15.0 14.0 - 18.0 g/dL    Hct 47.6 42.0 - 52.0 %    MCV 93.3 80.0 - 94.0 fL    MCH 29.4 27.0 - 31.0 pg    MCHC 31.5 (L) 33.0 - 36.0 g/dL    RDW 15.7 11.5 - 17.0 %    Platelet 129 (L) 130 -  400 x10(3)/mcL    MPV 12.0 (H) 7.4 - 10.4 fL    Neut % 88.4 %    Lymph % 4.0 %    Mono % 5.6 %    Eos % 0.3 %    Basophil % 0.4 %    Lymph # 0.61 0.6 - 4.6 x10(3)/mcL    Neut # 13.51 (H) 2.1 - 9.2 x10(3)/mcL    Mono # 0.85 0.1 - 1.3 x10(3)/mcL    Eos # 0.04 0 - 0.9 x10(3)/mcL    Baso # 0.06 <=0.2 x10(3)/mcL    IG# 0.20 (H) 0 - 0.04 x10(3)/mcL    IG% 1.3 %    NRBC% 0.1 %   COVID/RSV/FLU A&B PCR    Collection Time: 10/30/23  5:21 PM   Result Value Ref Range    Influenza A PCR Not Detected Not Detected    Influenza B PCR Not Detected Not Detected    Respiratory Syncytial Virus PCR Not Detected Not Detected    SARS-CoV-2 PCR Not Detected Not Detected, Negative, Invalid   Troponin I    Collection Time: 10/30/23  8:43 PM   Result Value Ref Range    Troponin-I 0.047 (H) 0.000 - 0.045 ng/mL   Comprehensive Metabolic Panel    Collection Time: 10/31/23  2:44 AM   Result Value Ref Range    Sodium Level 144 136 - 145 mmol/L    Potassium Level 4.3 3.5 - 5.1 mmol/L    Chloride 107 98 - 107 mmol/L    Carbon Dioxide 28 23 - 31 mmol/L    Glucose Level 103 82 - 115 mg/dL    Blood Urea Nitrogen 33.1 (H) 8.4 - 25.7 mg/dL    Creatinine 1.10 0.73 - 1.18 mg/dL    Calcium Level Total 8.6 (L) 8.8 - 10.0 mg/dL    Protein Total 6.0 5.8 - 7.6 gm/dL    Albumin Level 2.8 (L) 3.4 - 4.8 g/dL    Globulin 3.2 2.4 - 3.5 gm/dL    Albumin/Globulin Ratio 0.9 (L) 1.1 - 2.0 ratio    Bilirubin Total 1.3 <=1.5 mg/dL    Alkaline Phosphatase 114 40 - 150 unit/L    Alanine Aminotransferase 99 (H) 0 - 55 unit/L    Aspartate Aminotransferase 62 (H) 5 - 34 unit/L    eGFR >60 mls/min/1.73/m2   Troponin I    Collection Time: 10/31/23  2:44 AM   Result Value Ref Range    Troponin-I 0.046 (H) 0.000 - 0.045 ng/mL   CBC with Differential    Collection Time: 10/31/23  2:44 AM   Result Value Ref Range    WBC 16.38 (H) 4.50 - 11.50 x10(3)/mcL    RBC 5.19 4.70 - 6.10 x10(6)/mcL    Hgb 15.4 14.0 - 18.0 g/dL    Hct 48.0 42.0 - 52.0 %    MCV 92.5 80.0 - 94.0 fL    MCH 29.7  27.0 - 31.0 pg    MCHC 32.1 (L) 33.0 - 36.0 g/dL    RDW 15.6 11.5 - 17.0 %    Platelet 136 130 - 400 x10(3)/mcL    MPV 12.0 (H) 7.4 - 10.4 fL    Neut % 87.0 %    Lymph % 4.6 %    Mono % 6.5 %    Eos % 0.5 %    Basophil % 0.4 %    Lymph # 0.76 0.6 - 4.6 x10(3)/mcL    Neut # 14.24 (H) 2.1 - 9.2 x10(3)/mcL    Mono # 1.06 0.1 - 1.3 x10(3)/mcL    Eos # 0.09 0 - 0.9 x10(3)/mcL    Baso # 0.07 <=0.2 x10(3)/mcL    IG# 0.16 (H) 0 - 0.04 x10(3)/mcL    IG% 1.0 %    NRBC% 0.1 %   Respiratory Culture    Collection Time: 10/31/23  6:26 AM    Specimen: Sputum, Expectorated   Result Value Ref Range    GRAM STAIN Quality 2+     GRAM STAIN Few Gram positive cocci     GRAM STAIN Few Gram Positive Rods     GRAM STAIN Few Yeast    MRSA PCR    Collection Time: 10/31/23  6:26 AM   Result Value Ref Range    MRSA PCR SCRN ([facility]) Not Detected Not Detected       Significant Imaging:  Imaging Results              X-Ray Chest PA And Lateral (Final result)  Result time 10/30/23 16:52:57      Final result by Azeem Stanley MD (10/30/23 16:52:57)                   Narrative:    EXAMINATION  XR CHEST PA AND LATERAL    CLINICAL HISTORY  Shortness of breath;    TECHNIQUE  A total of 2 images submitted of the chest.    COMPARISON  None available at the time of initial interpretation.    FINDINGS  Lines/tubes/devices: Right chest wall subcutaneous port is present, catheter terminating at the upper to mid SVC region.  Single lead left chest wall pacemaker/ICD also noted.  Multiple ECG leads overlie the chest.    The cardiomediastinal silhouette and central pulmonary vasculature are unremarkable contour and size.  The trachea is midline.  Ill-defined hazy opacification of the bilateral mid and lower lung zones is appreciated, with no organized lobar consolidation identified.  Small right pleural effusion is suspected.  There is no convincing pneumothorax.    There is no acute osseous or extrathoracic abnormality.    IMPRESSION  1. Ill-defined bilateral  mid/lower zone hazy infiltrates, developing atypical infectious process not excluded.  2. Suspected small right pleural effusion.      Electronically signed by: Azeem Stanley  Date:    10/30/2023  Time:    16:52                                    EKG:    Results for orders placed or performed during the hospital encounter of 10/30/23   EKG 12-lead    Narrative    Test Reason : R06.02,    Vent. Rate : 110 BPM     Atrial Rate : 110 BPM     P-R Int : 154 ms          QRS Dur : 096 ms      QT Int : 338 ms       P-R-T Axes : 086 -40 122 degrees     QTc Int : 457 ms    Sinus tachycardia  Possible Left atrial enlargement  Left axis deviation  Minimal voltage criteria for LVH, may be normal variant ( Steptoe product )  Septal infarct ,age undetermined  ST and T wave abnormality, consider lateral ischemia  Abnormal ECG  No previous ECGs available  Confirmed by Baldo Forbes MD ([id number]) on 10/31/2023 12:40:36 AM    Referred By:             Confirmed By:Baldo Forbes MD     Physical Exam  Constitutional:       Appearance: Normal appearance. He is ill-appearing.   HENT:      Head: Normocephalic and atraumatic.   Eyes:      Extraocular Movements: Extraocular movements intact.      Pupils: Pupils are equal, round, and reactive to light.   Cardiovascular:      Rate and Rhythm: Normal rate and regular rhythm.      Pulses: Normal pulses.      Heart sounds: Normal heart sounds.   Pulmonary:      Effort: Pulmonary effort is normal. No respiratory distress.      Breath sounds: Normal breath sounds.   Abdominal:      General: There is no distension.      Palpations: Abdomen is soft.   Genitourinary:     Comments: Large hernia noted in left inguinal region   Musculoskeletal:         General: Normal range of motion.      Right lower leg: No edema.      Left lower leg: No edema.   Neurological:      Mental Status: He is alert.         Home Medications:   No current facility-administered medications on file prior to encounter.     No current  outpatient medications on file prior to encounter.       Current Inpatient Medications:    Current Facility-Administered Medications:     acetaminophen tablet 650 mg, 650 mg, Oral, Q8H PRN, Yovani Darling MD, 650 mg at 10/31/23 1018    acetaminophen tablet 650 mg, 650 mg, Oral, Q8H PRN, Yovani Darling MD, 650 mg at 10/31/23 0306    albuterol-ipratropium 2.5 mg-0.5 mg/3 mL nebulizer solution 3 mL, 3 mL, Nebulization, Q4H PRN, Yovani Darling MD    azithromycin 500 mg in dextrose 5 % 250 mL IVPB (ready to mix), 500 mg, Intravenous, Q24H, Yovani Darling MD, Stopped at 10/30/23 2208    enoxaparin injection 40 mg, 40 mg, Subcutaneous, Daily, Yovani Darling MD    furosemide injection 40 mg, 40 mg, Intravenous, Q12H, Yovani Darling MD, 40 mg at 10/31/23 0018    melatonin tablet 6 mg, 6 mg, Oral, Nightly PRN, Yovani Darling MD    piperacillin-tazobactam (ZOSYN) 4.5 g in dextrose 5 % in water (D5W) 100 mL IVPB (MB+), 4.5 g, Intravenous, Q8H, Yovani Darling MD, Last Rate: 25 mL/hr at 10/31/23 0955, 4.5 g at 10/31/23 0955    sodium chloride 0.9% flush 10 mL, 10 mL, Intravenous, PRN, Yovani Darling MD    sodium chloride 0.9% flush 10 mL, 10 mL, Intravenous, PRN, Yovani Darling MD  No current outpatient medications on file.         VTE Risk Mitigation (From admission, onward)           Ordered     enoxaparin injection 40 mg  Daily         10/30/23 2050     Place sequential compression device  Until discontinued         10/30/23 2049     IP VTE HIGH RISK PATIENT  Once         10/30/23 2050     Place sequential compression device  Until discontinued         10/30/23 2050                    Assessment:     Acute hypoxemic respiratory failure  Community-acquired pneumonia   Decompensated systolic congestive CHF   NSTEMI/type 2 demand ischemia 2/2 above  Left groin inguinal hernia  CHF, documented LVEF less than 20%, 10/2022 s/p ICD   CAD s/p PCI  10/2022  Essential hypertension   COPD, not on home oxygen   Lung cancer s/p XRT   Colon cancer, status post colectomy    Plan:     -Echo reviewed showing EF 15-20% w/ LV thrombus and diastolic dysfunction   -initiating patient on heparin gtt for continued treatment, will likely need to be transitioned to Coumadin during stay   -continue lasix 40mg BID, good urine output noted   -other care per primary team     Thank you for your consult.     Alexia Montenegro MD  Cardiology  [facility] - Emergency Dept  10/31/2023 10:20 AM     Attending physician note  I have rounded with medical resident. I personally reviewed case related differential diagnoses, assessment and plan. A thorough physical examination noted above is performed by me. I have personally reviewed the labs, test results and medication lists that are currently available.  See above MDM formulated by me (Spencer Pratt MD):     Start heparin for LV thrombus.  We will also start Coumadin in a.m. goal INR 2.0-3.0.  Continue Lasix    All of the patient's and/or family's questions have been addressed and answered to the best of my ability.

## 2023-10-31 NOTE — NURSING
Nurses Note -- 4 Eyes      10/31/2023   3:39 PM      Skin assessed during: Admit      [x] No Altered Skin Integrity Present    []Prevention Measures Documented      [] Yes- Altered Skin Integrity Present or Discovered   [] LDA Added if Not in Epic (Describe Wound)   [] New Altered Skin Integrity was Present on Admit and Documented in LDA   [] Wound Image Taken    Wound Care Consulted? No    Attending Nurse:  Taty Singh RN/Staff Member:   Marli

## 2023-10-31 NOTE — H&P
[facility] Hospital Medicine History & Physical Examination       Patient Name: Ye Vergara  MRN: [id number]  Patient Class: Emergency   Admission Date: 10/30/2023  2:16 PM  Length of Stay: 0  Admitting Service: Hospital Medicine   Attending Physician: Yovani Darling MD   Primary Care Provider: Aaron Weeks MD  History source: EMR, patient and/or patient's family    CHIEF COMPLAINT   Dyspnea    HISTORY OF PRESENT ILLNESS:   Patient is a 63-year-old male with a history of CHF with EF less than 20% status post ICD, CAD status post PCI 2022, essential hypertension, COPD, lung cancer status post XRT, colon cancer status post colectomy who was recently admitted at [facility] for sepsis from community-acquired pneumonia but reported left 2 days ago against medical advice.  He presents to the ER tonight complaining of persistent dyspnea, cough with purulent green mucus production.  He also complains of left groin pain, has a chronic left groin hernia, but states it easily reducible and he has been told he is not a surgical candidate due to his comorbidities.    He arrived afebrile but tachycardic and mildly tachypneic, hemodynamically stable and requiring 4 L nasal cannula to maintain adequate sats.  Laboratory work showed leukocytosis of 15 K, mild transaminitis, a BNP of 3500 and a mildly elevated troponin of 0.085.  Influenza and COVID testing was negative.  Chest x-ray showed ill-defined bilateral mid/lower zone hazy infiltrates concerning for developing atypical process.    PAST MEDICAL HISTORY:   CHF, documented LVEF less than 20%, 10/2022 s/p ICD   CAD s/p PCI 10/2022  Essential hypertension   COPD, not on home 02  Lung cancer, s/p chemorXRT (>10 yrs ago)   Colon cancer, status post colectomy (>10 yers ago)   Hx of prostate cancer    PAST SURGICAL HISTORY:   Colectomy   ICD placement    ALLERGIES:   Patient has no known allergies.    FAMILY HISTORY:   Reviewed  "and non-contributory     SOCIAL HISTORY:   Denies current drug or EtOH use    HOME MEDICATIONS:     Prior to Admission medications    Not on File       REVIEW OF SYSTEMS:   Except as documented, all other systems reviewed and negative     PHYSICAL EXAM:   T 97.5 °F (36.4 °C)   /89   P 108   RR 19   O2 96 %  GENERAL: awake, alert, oriented and in no acute distress, non-toxic appearing   HEENT: normocephalic atraumatic   NECK: supple   LUNGS:  Sounds with bilateral rhonchi, no accessory muscle use   CVS: Regular rate and rhythm, normal peripheral perfusion  ABD: Soft, non-tender, non-distended, bowel sounds present  EXTREMITIES: no clubbing or cyanosis  SKIN: Warm, dry.   NEURO: alert and oriented, grossly without focal deficits   PSYCHIATRIC: Cooperative    LABS AND IMAGING:     Recent Labs     10/30/23  1514   WBC 15.27*   RBC 5.10   HGB 15.0   HCT 47.6   MCV 93.3   MCH 29.4   MCHC 31.5*   RDW 15.7   *     Recent Labs     10/30/23  1514   LACTIC 1.5     No results for input(s): "INR", "APTT", "D-DIMER" in the last 72 hours.  No results for input(s): "HGBA1C", "CHOL", "TRIG", "LDL", "VLDL", "HDL" in the last 72 hours.   Recent Labs     10/30/23  1514      K 4.0   CHLORIDE 108*   CO2 24   BUN 38.6*   CREATININE 1.05   GLUCOSE 97   CALCIUM 8.4*   ALBUMIN 3.1*   GLOBULIN 3.0   ALKPHOS 136   *   AST 82*   BILITOT 1.4     Recent Labs     10/30/23  1514   BNP 3,544.2*   TROPONINI 0.085*          X-Ray Chest PA And Lateral  EXAMINATION  XR CHEST PA AND LATERAL    CLINICAL HISTORY  Shortness of breath;    TECHNIQUE  A total of 2 images submitted of the chest.    COMPARISON  None available at the time of initial interpretation.    FINDINGS  Lines/tubes/devices: Right chest wall subcutaneous port is present, catheter terminating at the upper to mid SVC region.  Single lead left chest wall pacemaker/ICD also noted.  Multiple ECG leads overlie the chest.    The cardiomediastinal silhouette and central " pulmonary vasculature are unremarkable contour and size.  The trachea is midline.  Ill-defined hazy opacification of the bilateral mid and lower lung zones is appreciated, with no organized lobar consolidation identified.  Small right pleural effusion is suspected.  There is no convincing pneumothorax.    There is no acute osseous or extrathoracic abnormality.    IMPRESSION  1. Ill-defined bilateral mid/lower zone hazy infiltrates, developing atypical infectious process not excluded.  2. Suspected small right pleural effusion.    Electronically signed by: Azeem Stanley  Date:    10/30/2023  Time:    16:52      ASSESSMENT & PLAN:   Multifactorial acute hypoxemic respiratory failure  Community-acquired pneumonia contributing to above  Decompensated systolic congestive CHF contributing to above  NSTEMI/type 2 demand ischemia 2/2 above  Left groin inguinal hernia  CHF, documented LVEF less than 20%, 10/2022 s/p ICD   CAD s/p PCI 10/2022  Essential hypertension   COPD, not on home oxygen   Lung cancer  Colon cancer, status post colectomy    - blood cultures, respiratory PCR and culture  - continue broad-spectrum empiric antibiotics   - nebs as needed  - IV Lasix, strict in's and out's   - tele monitoring, echocardiogram, trend cardiac enzymes   - resume home medications pending med rec    DVT prophylaxis: lovenox  Code status: full     If patient was admitted under observational status it is with my approval/permission.     At least 55 min was spent on this history and physical.  Time seen: 9PM 10/30/23  Critical care time = 35 min; Critical care diagnosis = hypoxia   Yovani Darling MD

## 2023-11-01 PROBLEM — E43 SEVERE MALNUTRITION: Status: RESOLVED | Noted: 2023-11-01 | Resolved: 2023-11-01

## 2023-11-01 PROBLEM — E43 SEVERE MALNUTRITION: Status: ACTIVE | Noted: 2023-11-01

## 2023-11-01 PROBLEM — E44.0 MODERATE MALNUTRITION: Status: ACTIVE | Noted: 2023-11-01

## 2023-11-01 LAB
ANION GAP SERPL CALC-SCNC: 12 MEQ/L
APTT PPP: 63.7 SECONDS (ref 23.2–33.7)
APTT PPP: 64.7 SECONDS (ref 23.2–33.7)
APTT PPP: 81.3 SECONDS (ref 23.2–33.7)
APTT PPP: 85.5 SECONDS (ref 23.2–33.7)
BASOPHILS # BLD AUTO: 0.08 X10(3)/MCL
BASOPHILS NFR BLD AUTO: 0.4 %
BUN SERPL-MCNC: 26.7 MG/DL (ref 8.4–25.7)
CALCIUM SERPL-MCNC: 7.9 MG/DL (ref 8.8–10)
CHLORIDE SERPL-SCNC: 100 MMOL/L (ref 98–107)
CO2 SERPL-SCNC: 27 MMOL/L (ref 23–31)
CREAT SERPL-MCNC: 0.87 MG/DL (ref 0.73–1.18)
CREAT/UREA NIT SERPL: 31
EOSINOPHIL # BLD AUTO: 0.26 X10(3)/MCL (ref 0–0.9)
EOSINOPHIL NFR BLD AUTO: 1.2 %
ERYTHROCYTE [DISTWIDTH] IN BLOOD BY AUTOMATED COUNT: 15.4 % (ref 11.5–17)
GFR SERPLBLD CREATININE-BSD FMLA CKD-EPI: >60 MLS/MIN/1.73/M2
GLUCOSE SERPL-MCNC: 99 MG/DL (ref 82–115)
HCT VFR BLD AUTO: 44.7 % (ref 42–52)
HCT VFR BLD AUTO: 48.5 % (ref 42–52)
HGB BLD-MCNC: 14.2 G/DL (ref 14–18)
HGB BLD-MCNC: 15.4 G/DL (ref 14–18)
IMM GRANULOCYTES # BLD AUTO: 0.32 X10(3)/MCL (ref 0–0.04)
IMM GRANULOCYTES NFR BLD AUTO: 1.4 %
LYMPHOCYTES # BLD AUTO: 1.42 X10(3)/MCL (ref 0.6–4.6)
LYMPHOCYTES NFR BLD AUTO: 6.4 %
MAGNESIUM SERPL-MCNC: 1.3 MG/DL (ref 1.6–2.6)
MCH RBC QN AUTO: 29.3 PG (ref 27–31)
MCHC RBC AUTO-ENTMCNC: 31.8 G/DL (ref 33–36)
MCV RBC AUTO: 92.4 FL (ref 80–94)
MONOCYTES # BLD AUTO: 1.77 X10(3)/MCL (ref 0.1–1.3)
MONOCYTES NFR BLD AUTO: 8 %
NEUTROPHILS # BLD AUTO: 18.23 X10(3)/MCL (ref 2.1–9.2)
NEUTROPHILS NFR BLD AUTO: 82.6 %
NRBC BLD AUTO-RTO: 0 %
PLATELET # BLD AUTO: 126 X10(3)/MCL (ref 130–400)
PLATELETS.RETICULATED NFR BLD AUTO: 15.3 % (ref 0.9–11.2)
PMV BLD AUTO: 12.3 FL (ref 7.4–10.4)
POTASSIUM SERPL-SCNC: 3.4 MMOL/L (ref 3.5–5.1)
RBC # BLD AUTO: 5.25 X10(6)/MCL (ref 4.7–6.1)
SODIUM SERPL-SCNC: 139 MMOL/L (ref 136–145)
WBC # SPEC AUTO: 22.08 X10(3)/MCL (ref 4.5–11.5)

## 2023-11-01 PROCEDURE — 99900035 HC TECH TIME PER 15 MIN (STAT)

## 2023-11-01 PROCEDURE — 25500020 PHARM REV CODE 255: Performed by: HOSPITALIST

## 2023-11-01 PROCEDURE — 25000003 PHARM REV CODE 250: Performed by: HOSPITALIST

## 2023-11-01 PROCEDURE — 85730 THROMBOPLASTIN TIME PARTIAL: CPT | Performed by: HOSPITALIST

## 2023-11-01 PROCEDURE — 27000221 HC OXYGEN, UP TO 24 HOURS

## 2023-11-01 PROCEDURE — 63600175 PHARM REV CODE 636 W HCPCS: Performed by: INTERNAL MEDICINE

## 2023-11-01 PROCEDURE — 63600175 PHARM REV CODE 636 W HCPCS

## 2023-11-01 PROCEDURE — 85014 HEMATOCRIT: CPT | Performed by: NURSE PRACTITIONER

## 2023-11-01 PROCEDURE — 80048 BASIC METABOLIC PNL TOTAL CA: CPT | Performed by: HOSPITALIST

## 2023-11-01 PROCEDURE — 83735 ASSAY OF MAGNESIUM: CPT | Performed by: HOSPITALIST

## 2023-11-01 PROCEDURE — 85730 THROMBOPLASTIN TIME PARTIAL: CPT | Performed by: INTERNAL MEDICINE

## 2023-11-01 PROCEDURE — 63600175 PHARM REV CODE 636 W HCPCS: Performed by: HOSPITALIST

## 2023-11-01 PROCEDURE — 85025 COMPLETE CBC W/AUTO DIFF WBC: CPT | Performed by: HOSPITALIST

## 2023-11-01 PROCEDURE — 25000003 PHARM REV CODE 250: Performed by: INTERNAL MEDICINE

## 2023-11-01 PROCEDURE — 21400001 HC TELEMETRY ROOM

## 2023-11-01 PROCEDURE — 94761 N-INVAS EAR/PLS OXIMETRY MLT: CPT

## 2023-11-01 PROCEDURE — 25000003 PHARM REV CODE 250

## 2023-11-01 RX ORDER — CARVEDILOL 3.12 MG/1
3.12 TABLET ORAL 2 TIMES DAILY
Status: DISCONTINUED | OUTPATIENT
Start: 2023-11-01 | End: 2023-11-02

## 2023-11-01 RX ORDER — WARFARIN SODIUM 5 MG/1
5 TABLET ORAL DAILY
Status: DISCONTINUED | OUTPATIENT
Start: 2023-11-01 | End: 2023-11-01

## 2023-11-01 RX ORDER — MAGNESIUM SULFATE HEPTAHYDRATE 40 MG/ML
2 INJECTION, SOLUTION INTRAVENOUS ONCE
Status: COMPLETED | OUTPATIENT
Start: 2023-11-01 | End: 2023-11-01

## 2023-11-01 RX ORDER — POTASSIUM CHLORIDE 7.45 MG/ML
40 INJECTION INTRAVENOUS ONCE
Status: COMPLETED | OUTPATIENT
Start: 2023-11-01 | End: 2023-11-01

## 2023-11-01 RX ADMIN — WARFARIN SODIUM 5 MG: 5 TABLET ORAL at 04:11

## 2023-11-01 RX ADMIN — HEPARIN SODIUM 18 UNITS/KG/HR: 10000 INJECTION, SOLUTION INTRAVENOUS at 08:11

## 2023-11-01 RX ADMIN — FUROSEMIDE 40 MG: 10 INJECTION, SOLUTION INTRAMUSCULAR; INTRAVENOUS at 05:11

## 2023-11-01 RX ADMIN — PIPERACILLIN AND TAZOBACTAM 4.5 G: 4; .5 INJECTION, POWDER, LYOPHILIZED, FOR SOLUTION INTRAVENOUS; PARENTERAL at 04:11

## 2023-11-01 RX ADMIN — FUROSEMIDE 40 MG: 10 INJECTION, SOLUTION INTRAMUSCULAR; INTRAVENOUS at 11:11

## 2023-11-01 RX ADMIN — PIPERACILLIN AND TAZOBACTAM 4.5 G: 4; .5 INJECTION, POWDER, LYOPHILIZED, FOR SOLUTION INTRAVENOUS; PARENTERAL at 01:11

## 2023-11-01 RX ADMIN — AZITHROMYCIN MONOHYDRATE 500 MG: 500 INJECTION, POWDER, LYOPHILIZED, FOR SOLUTION INTRAVENOUS at 11:11

## 2023-11-01 RX ADMIN — HYDROCODONE BITARTRATE AND ACETAMINOPHEN 1 TABLET: 10; 325 TABLET ORAL at 08:11

## 2023-11-01 RX ADMIN — MAGNESIUM SULFATE IN WATER 2 G: 40 INJECTION, SOLUTION INTRAVENOUS at 09:11

## 2023-11-01 RX ADMIN — IOPAMIDOL 100 ML: 755 INJECTION, SOLUTION INTRAVENOUS at 10:11

## 2023-11-01 RX ADMIN — PIPERACILLIN AND TAZOBACTAM 4.5 G: 4; .5 INJECTION, POWDER, LYOPHILIZED, FOR SOLUTION INTRAVENOUS; PARENTERAL at 11:11

## 2023-11-01 RX ADMIN — POTASSIUM CHLORIDE 40 MEQ: 7.46 INJECTION, SOLUTION INTRAVENOUS at 08:11

## 2023-11-01 RX ADMIN — CARVEDILOL 3.12 MG: 3.12 TABLET, FILM COATED ORAL at 11:11

## 2023-11-01 RX ADMIN — HYDROCODONE BITARTRATE AND ACETAMINOPHEN 1 TABLET: 10; 325 TABLET ORAL at 06:11

## 2023-11-01 NOTE — PROGRESS NOTES
[facility] Hospital Medicine Progress Note        Chief Complaint: Inpatient Follow-up     HPI:   63-year-old male with a history of CHF with EF less than 20% status post ICD, CAD status post PCI 2022, essential hypertension, COPD, lung cancer status post XRT, colon cancer status post colectomy who was recently admitted at [facility] for sepsis from community-acquired pneumonia but reported left 2 days ago against medical advice.  He presents to the ER tonight complaining of persistent dyspnea, cough with purulent green mucus production.  He also complains of left groin pain, has a chronic left groin hernia, but states it easily reducible and he has been told he is not a surgical candidate due to his comorbidities.     He arrived afebrile but tachycardic and mildly tachypneic, hemodynamically stable and requiring 4 L nasal cannula to maintain adequate sats.  Laboratory work showed leukocytosis of 15 K, mild transaminitis, a BNP of 3500 and a mildly elevated troponin of 0.085.  Influenza and COVID testing was negative.  Chest x-ray showed ill-defined bilateral mid/lower zone hazy infiltrates concerning for developing atypical process.  Echocardiogram revealed LV thrombus.  Cardiology started on heparin drip with Coumadin.  Will need lifelong Coumadin and follow up.      Interval Hx:  Patient resting comfortably in bed.  Currently on 2 L nasal cannula.  He is had very good urine output with diuretics.  Remains on heparin drip.  Asking for a Villagomez due to excessive urine output     Objective/physical exam:  General: In no acute distress, afebrile  Chest: Clear to auscultation bilaterally  Heart: RRR, +S1, S2, no appreciable murmur  Abdomen: Soft, nontender, BS +  MSK: Warm, no lower extremity edema, no clubbing or cyanosis  Neurologic: Alert and oriented x4, Cranial nerve II-XII intact, Strength 5/5 in all 4 extremities     VITAL SIGNS: 24 HRS MIN & MAX LAST   Temp  Min: 97.3 °F  (36.3 °C)  Max: 97.5 °F (36.4 °C) 97.4 °F (36.3 °C)   BP  Min: 103/71  Max: 131/90 115/69   Pulse  Min: 86  Max: 109  97   Resp  Min: 13  Max: 21 20   SpO2  Min: 83 %  Max: 99 % 96 %       Recent Labs   Lab 10/31/23  0244 10/31/23  1539 11/01/23 0419   WBC 16.38* 16.79* 22.08*   RBC 5.19 5.29 5.25   HGB 15.4 15.6 15.4   HCT 48.0 49.4 48.5   MCV 92.5 93.4 92.4   MCH 29.7 29.5 29.3   MCHC 32.1* 31.6* 31.8*   RDW 15.6 15.8 15.4    136 126*   MPV 12.0* 11.8* 12.3*       Recent Labs   Lab 10/30/23  1514 10/31/23  0244 11/01/23  0419    144 139   K 4.0 4.3 3.4*   CO2 24 28 27   BUN 38.6* 33.1* 26.7*   CREATININE 1.05 1.10 0.87   CALCIUM 8.4* 8.6* 7.9*   MG  --   --  1.30*   ALBUMIN 3.1* 2.8*  --    ALKPHOS 136 114  --    * 99*  --    AST 82* 62*  --    BILITOT 1.4 1.3  --           Microbiology Results (last 7 days)       Procedure Component Value Units Date/Time    Blood Culture [[id number]]  (Normal) Collected: 10/30/23 1514    Order Status: Completed Specimen: Blood Updated: 10/31/23 1601     CULTURE, BLOOD ([facility]) No Growth At 24 Hours    Blood Culture [[id number]]  (Normal) Collected: 10/30/23 1514    Order Status: Completed Specimen: Blood Updated: 10/31/23 1601     CULTURE, BLOOD ([facility]) No Growth At 24 Hours    Respiratory Culture [[id number]] Collected: 10/31/23 0626    Order Status: Completed Specimen: Sputum, Expectorated Updated: 10/31/23 0848     GRAM STAIN Quality 2+      Few Gram positive cocci      Few Gram Positive Rods      Few Yeast             Radiology:  Echo    Left Ventricle: The left ventricle is severely dilated. Severe global   hypokinesis present. There is severely reduced systolic function with a   visually estimated ejection fraction of 15 - 20%. There are mural thrombus   located on the anterior lateral and inferior, apical inferior. Grade III   diastolic dysfunction.    Right Ventricle: Systolic function is borderline low.    Left Atrium: Left atrium is moderately dilated.     Mitral Valve: There is mild mitral annular calcification present. There   is moderate regurgitation.    Tricuspid Valve: There is mild to moderate regurgitation. The estimated   PA systolic pressure is at least 52 mmHg.    Pulmonary Artery: There is moderate pulmonary hypertension.      Scheduled Med:   azithromycin  500 mg Intravenous Q24H    furosemide (LASIX) injection  40 mg Intravenous Q12H    piperacillin-tazobactam (Zosyn) IV (PEDS and ADULTS) (extended infusion is not appropriate)  4.5 g Intravenous Q8H        Continuous Infusions:   heparin (porcine) in D5W 18 Units/kg/hr (10/31/23 1639)        PRN Meds:  acetaminophen, acetaminophen, albuterol-ipratropium, heparin (PORCINE), heparin (PORCINE), HYDROcodone-acetaminophen, melatonin, sodium chloride 0.9%, sodium chloride 0.9%       Assessment/Plan:   Multifactorial acute hypoxemic respiratory failure  Community-acquired pneumonia contributing to above  Decompensated systolic congestive CHF contributing to above  LV thrombus  NSTEMI/type 2 demand ischemia 2/2 above  Left groin inguinal hernia  CHF, documented LVEF less than 20%, 10/2022 s/p ICD   CAD s/p PCI 10/2022  Essential hypertension   COPD, not on home oxygen   Lung cancer  Colon cancer, status post colectomy    Patient did have a bump in his white count this morning but he was on appropriate antimicrobials.  Continue with Zosyn and azithromycin, day 3.  Repeat labs tomorrow morning.  He was afebrile.  Sputum culture with multiple organisms.  This will likely end up being normal lashell.  Blood cultures negative so far.  He remains on IV Lasix 40 mg b.i.d..  Cardiology following for diuresis.  He has a low potassium and Mag this morning which I will place.  Renal function is at baseline.  Patient started on heparin drip for LV thrombus.  Also needs initiation on Coumadin.  Will defer to Cardiology for starting dose.  Would like to get him evaluated by PT and OT.  Still little tenuous at this point.  Will  reassess tomorrow.    Critical care diagnosis: acute systolic heart failure requiring IV diuretics  Critical care interventions: hands on evaluation, review of labs/radiographs/records and discussions with family  Critical care time spent: >32 minutes      David Goldberg MD   11/01/2023     All diagnosis and differential diagnosis have been reviewed; assessment and plan has been documented; I have personally reviewed the labs and test results that are presently available; I have reviewed the patients medication list; I have reviewed the consulting providers response and recommendations. I have reviewed or attempted to review medical records based upon their availability    All of the patient's questions have been  addressed and answered. Patient's is agreeable to the above stated plan. I will continue to monitor closely and make adjustments to medical management as needed.  _____________________________________________________________________

## 2023-11-01 NOTE — PLAN OF CARE
11/01/23 1221   Discharge Assessment   Assessment Type Discharge Planning Assessment   Confirmed/corrected address, phone number and insurance Yes   Confirmed Demographics Correct on Facesheet   Source of Information patient   Communicated ADILIA with patient/caregiver Date not available/Unable to determine   Reason For Admission Pneumonia   People in Home parent(s)   Do you expect to return to your current living situation? Yes   Current cognitive status: Alert/Oriented   Home Accessibility stairs to enter home   Number of Stairs, Main Entrance two   Stair Railings, Main Entrance railing on left side (ascending)   Equipment Currently Used at Home oxygen   Patient currently being followed by outpatient case management? No   Do you currently have service(s) that help you manage your care at home? No   Do you take prescription medications? Yes   Do you have prescription coverage? Yes   Coverage [employer]   Is the patient taking medications as prescribed? yes   Who is going to help you get home at discharge? Son or daughter   How do you get to doctors appointments? car, drives self   Are you on dialysis? No   Do you take coumadin? No   DME Needed Upon Discharge  other (see comments)  (To be determined)   Discharge Plan discussed with: Patient   Transition of Care Barriers None   Discharge Plan A Home   Discharge Plan B Home   Physical Activity   On average, how many days per week do you engage in moderate to strenuous exercise (like a brisk walk)? 0 days   On average, how many minutes do you engage in exercise at this level? 0 min   Financial Resource Strain   How hard is it for you to pay for the very basics like food, housing, medical care, and heating? Very Hard   Housing Stability   In the last 12 months, was there a time when you were not able to pay the mortgage or rent on time? N   In the last 12 months, how many places have you lived? 1   In the last 12 months, was there a time when you did not have a steady place  to sleep or slept in a shelter (including now)? N   Transportation Needs   In the past 12 months, has lack of transportation kept you from medical appointments or from getting medications? no   In the past 12 months, has lack of transportation kept you from meetings, work, or from getting things needed for daily living? No   Food Insecurity   Within the past 12 months, you worried that your food would run out before you got the money to buy more. Often true   Within the past 12 months, the food you bought just didn't last and you didn't have money to get more. Often true   Stress   Do you feel stress - tense, restless, nervous, or anxious, or unable to sleep at night because your mind is troubled all the time - these days? Very much   Social Connections   In a typical week, how many times do you talk on the phone with family, friends, or neighbors? Twice a week   How often do you get together with friends or relatives? Once   How often do you attend Episcopalian or [employer] services? Never   Do you belong to any clubs or organizations such as Ep[employer]op[employer] groups, unions, fraternal or athletic groups, or school groups? No   How often do you attend meetings of the clubs or organizations you belong to? Never   Are you , , , , never , or living with a partner?    Alcohol Use   Q1: How often do you have a drink containing alcohol? Monthly or l   Q2: How many drinks containing alcohol do you have on a typical day when you are drinking? 1 or 2   Q3: How often do you have six or more drinks on one occasion? Never   OTHER   Name(s) of People in Home Mother Carin

## 2023-11-01 NOTE — ASSESSMENT & PLAN NOTE
"Patient meets ASPEN criteria for moderate malnutrition of acute illness or injury per RD assessment as evidenced by:  Energy Intake (Malnutrition): less than or equal to 50% for greater than or equal to 5 days  Weight Loss (Malnutrition):  (does not meet criteria)  Subcutaneous Fat (Malnutrition): moderate depletion  Muscle Mass (Malnutrition): moderate depletion  Fluid Accumulation (Malnutrition): mild        A minimum of two characteristics is recommended for diagnosis of either severe or non-severe malnutrition.    Ht Readings from Last 1 Encounters:   10/31/23 5' 8.9" (1.75 m)     Wt Readings from Last 1 Encounters:   10/31/23 1537 73.4 kg (161 lb 13.1 oz)   10/31/23 1202 68.5 kg (151 lb)   10/30/23 1815 68.5 kg (151 lb)     Body mass index is 23.97 kg/m².    Patient has been screened and assessed by RD. See nutrition recommendations documented in inpatient nutrition assessment. RD will continue to follow patient throughout hospitalization.  "

## 2023-11-01 NOTE — PROGRESS NOTES
[facility] - 4th Floor Medical Telemetry    Cardiology  Progress Note    Patient Name: Ye Vergara  MRN: [id number]  Admission Date: 10/30/2023  Hospital Length of Stay: 2 days  Code Status: Full Code   Attending Physician: David Goldberg MD   Primary Care Physician: Aaron Weeks MD  Expected Discharge Date:   Principal Problem:CAP (community acquired pneumonia)    Subjective:     Brief HPI:   Mr. Vergara is a 62 y/o male who initially presented to the ED 10/30/2023 with complaints of dyspnea on exertion and productive cough with green sputum.  Per chart review it appears patient was recently admitted at [facility] for sepsis from CAP but left AMA 2 days ago.  Lab work on admission was notable for leukocytosis with WBC 15, BNP 3500, elevated trop of 0.085.  CXR performed showing ill-defined bilateral mid/lower zone hazy infiltrates concerning for developing atypical process.  Patient has known history of HF with EF 20%.  Patient was given lasix 40mg BID with good urine output.  Cardiology was consulted in setting of CHF exacerbation and NSTEMI.      Hospital Course:   Echo w/ EF 15-20% and LV mural thrombus noted - started on heparin gtt 10/31/2023 and coumadin on 11/1/2023 - goal INR 2-3  Urine output 4.325L over 24 hours  Remains on 4L NC   Resting comfortable in bed, denies any chest pain or palpitations.  Endorsing continued productive cough and shortness of breath although states it is improved since admission.  Continues to endorses significant pain to left inguinal region secondary to hernia.    No significant swelling noted to lower extremities, course breath sounds noted on auscultation of lungs with slight expiratory wheezing present.       Review of Systems   Cardiovascular:  Negative for chest pain, leg swelling and palpitations.   Respiratory:  Positive for cough, shortness of breath and sputum production.    Gastrointestinal:  Negative for nausea and vomiting.        Objective:     Vital Signs (Most Recent):  Temp: 97.4 °F (36.3 °C) (11/01/23 0824)  Pulse: 97 (11/01/23 0438)  Resp: 20 (11/01/23 0815)  BP: 95/64 (11/01/23 0824)  SpO2: 96 % (11/01/23 0438) Vital Signs (24h Range):  Temp:  [97.3 °F (36.3 °C)-97.5 °F (36.4 °C)] 97.4 °F (36.3 °C)  Pulse:  [] 97  Resp:  [13-21] 20  SpO2:  [96 %-99 %] 96 %  BP: ()/(64-90) 95/64     Weight: 73.4 kg (161 lb 13.1 oz)  Body mass index is 23.97 kg/m².    SpO2: 96 %         Intake/Output Summary (Last 24 hours) at 11/1/2023 0848  Last data filed at 11/1/2023 0711  Gross per 24 hour   Intake 480 ml   Output 4325 ml   Net -3845 ml       Lines/Drains/Airways       Drain  Duration                  Colostomy RLQ -- days              Peripheral Intravenous Line  Duration                  Peripheral IV - Single Lumen 10/31/23 0747 20 G Left Antecubital 1 day         Peripheral IV - Single Lumen 10/31/23 1736 20 G Anterior;Right Forearm <1 day                    Significant Labs:   Recent Results (from the past 72 hour(s))   Comprehensive Metabolic Panel    Collection Time: 10/30/23  3:14 PM   Result Value Ref Range    Sodium Level 141 136 - 145 mmol/L    Potassium Level 4.0 3.5 - 5.1 mmol/L    Chloride 108 (H) 98 - 107 mmol/L    Carbon Dioxide 24 23 - 31 mmol/L    Glucose Level 97 82 - 115 mg/dL    Blood Urea Nitrogen 38.6 (H) 8.4 - 25.7 mg/dL    Creatinine 1.05 0.73 - 1.18 mg/dL    Calcium Level Total 8.4 (L) 8.8 - 10.0 mg/dL    Protein Total 6.1 5.8 - 7.6 gm/dL    Albumin Level 3.1 (L) 3.4 - 4.8 g/dL    Globulin 3.0 2.4 - 3.5 gm/dL    Albumin/Globulin Ratio 1.0 (L) 1.1 - 2.0 ratio    Bilirubin Total 1.4 <=1.5 mg/dL    Alkaline Phosphatase 136 40 - 150 unit/L    Alanine Aminotransferase 122 (H) 0 - 55 unit/L    Aspartate Aminotransferase 82 (H) 5 - 34 unit/L    eGFR >60 mls/min/1.73/m2   BNP    Collection Time: 10/30/23  3:14 PM   Result Value Ref Range    Natriuretic Peptide 3,544.2 (H) <=100.0 pg/mL   Troponin I    Collection  Time: 10/30/23  3:14 PM   Result Value Ref Range    Troponin-I 0.085 (H) 0.000 - 0.045 ng/mL   Blood Culture    Collection Time: 10/30/23  3:14 PM    Specimen: Blood   Result Value Ref Range    CULTURE, BLOOD ([facility]) No Growth At 24 Hours    Blood Culture    Collection Time: 10/30/23  3:14 PM    Specimen: Blood   Result Value Ref Range    CULTURE, BLOOD ([facility]) No Growth At 24 Hours    Lactic Acid, Plasma    Collection Time: 10/30/23  3:14 PM   Result Value Ref Range    Lactic Acid Level 1.5 0.5 - 2.2 mmol/L   CBC with Differential    Collection Time: 10/30/23  3:14 PM   Result Value Ref Range    WBC 15.27 (H) 4.50 - 11.50 x10(3)/mcL    RBC 5.10 4.70 - 6.10 x10(6)/mcL    Hgb 15.0 14.0 - 18.0 g/dL    Hct 47.6 42.0 - 52.0 %    MCV 93.3 80.0 - 94.0 fL    MCH 29.4 27.0 - 31.0 pg    MCHC 31.5 (L) 33.0 - 36.0 g/dL    RDW 15.7 11.5 - 17.0 %    Platelet 129 (L) 130 - 400 x10(3)/mcL    MPV 12.0 (H) 7.4 - 10.4 fL    Neut % 88.4 %    Lymph % 4.0 %    Mono % 5.6 %    Eos % 0.3 %    Basophil % 0.4 %    Lymph # 0.61 0.6 - 4.6 x10(3)/mcL    Neut # 13.51 (H) 2.1 - 9.2 x10(3)/mcL    Mono # 0.85 0.1 - 1.3 x10(3)/mcL    Eos # 0.04 0 - 0.9 x10(3)/mcL    Baso # 0.06 <=0.2 x10(3)/mcL    IG# 0.20 (H) 0 - 0.04 x10(3)/mcL    IG% 1.3 %    NRBC% 0.1 %   COVID/RSV/FLU A&B PCR    Collection Time: 10/30/23  5:21 PM   Result Value Ref Range    Influenza A PCR Not Detected Not Detected    Influenza B PCR Not Detected Not Detected    Respiratory Syncytial Virus PCR Not Detected Not Detected    SARS-CoV-2 PCR Not Detected Not Detected, Negative, Invalid   Troponin I    Collection Time: 10/30/23  8:43 PM   Result Value Ref Range    Troponin-I 0.047 (H) 0.000 - 0.045 ng/mL   Comprehensive Metabolic Panel    Collection Time: 10/31/23  2:44 AM   Result Value Ref Range    Sodium Level 144 136 - 145 mmol/L    Potassium Level 4.3 3.5 - 5.1 mmol/L    Chloride 107 98 - 107 mmol/L    Carbon Dioxide 28 23 - 31 mmol/L    Glucose Level 103 82 - 115 mg/dL    Blood  Urea Nitrogen 33.1 (H) 8.4 - 25.7 mg/dL    Creatinine 1.10 0.73 - 1.18 mg/dL    Calcium Level Total 8.6 (L) 8.8 - 10.0 mg/dL    Protein Total 6.0 5.8 - 7.6 gm/dL    Albumin Level 2.8 (L) 3.4 - 4.8 g/dL    Globulin 3.2 2.4 - 3.5 gm/dL    Albumin/Globulin Ratio 0.9 (L) 1.1 - 2.0 ratio    Bilirubin Total 1.3 <=1.5 mg/dL    Alkaline Phosphatase 114 40 - 150 unit/L    Alanine Aminotransferase 99 (H) 0 - 55 unit/L    Aspartate Aminotransferase 62 (H) 5 - 34 unit/L    eGFR >60 mls/min/1.73/m2   Troponin I    Collection Time: 10/31/23  2:44 AM   Result Value Ref Range    Troponin-I 0.046 (H) 0.000 - 0.045 ng/mL   CBC with Differential    Collection Time: 10/31/23  2:44 AM   Result Value Ref Range    WBC 16.38 (H) 4.50 - 11.50 x10(3)/mcL    RBC 5.19 4.70 - 6.10 x10(6)/mcL    Hgb 15.4 14.0 - 18.0 g/dL    Hct 48.0 42.0 - 52.0 %    MCV 92.5 80.0 - 94.0 fL    MCH 29.7 27.0 - 31.0 pg    MCHC 32.1 (L) 33.0 - 36.0 g/dL    RDW 15.6 11.5 - 17.0 %    Platelet 136 130 - 400 x10(3)/mcL    MPV 12.0 (H) 7.4 - 10.4 fL    Neut % 87.0 %    Lymph % 4.6 %    Mono % 6.5 %    Eos % 0.5 %    Basophil % 0.4 %    Lymph # 0.76 0.6 - 4.6 x10(3)/mcL    Neut # 14.24 (H) 2.1 - 9.2 x10(3)/mcL    Mono # 1.06 0.1 - 1.3 x10(3)/mcL    Eos # 0.09 0 - 0.9 x10(3)/mcL    Baso # 0.07 <=0.2 x10(3)/mcL    IG# 0.16 (H) 0 - 0.04 x10(3)/mcL    IG% 1.0 %    NRBC% 0.1 %   Respiratory Culture    Collection Time: 10/31/23  6:26 AM    Specimen: Sputum, Expectorated   Result Value Ref Range    GRAM STAIN Quality 2+     GRAM STAIN Few Gram positive cocci     GRAM STAIN Few Gram Positive Rods     GRAM STAIN Few Yeast    MRSA PCR    Collection Time: 10/31/23  6:26 AM   Result Value Ref Range    MRSA PCR SCRN ([facility]) Not Detected Not Detected   Echo    Collection Time: 10/31/23 12:41 PM   Result Value Ref Range    BSA 1.82 m2    Guerra's Biplane MOD Ejection Fraction 17 %    LVOT stroke volume 27.49 cm3    LVIDd 6.44 (A) 3.5 - 6.0 cm    LV Systolic Volume 152.00 mL    LV  Systolic Volume Index 83.1 mL/m2    LVIDs 5.57 (A) 2.1 - 4.0 cm    LV Diastolic Volume 211.00 mL    LV Diastolic Volume Index 115.30 mL/m2    IVS 0.81 0.6 - 1.1 cm    LVOT diameter 1.90 cm    LVOT area 2.8 cm2    FS 14 (A) 28 - 44 %    Left Ventricle Relative Wall Thickness 0.28 cm    Posterior Wall 0.89 0.6 - 1.1 cm    LV mass 227.12 g    LV Mass Index 124 g/m2    MV Peak E Mikie 0.68 m/s    TDI LATERAL 0.06 m/s    TDI SEPTAL 0.04 m/s    E/E' ratio 13.60 m/s    MV Peak A Mikie 0.66 m/s    TR Max Mikie 3.25 m/s    E/A ratio 1.03     E wave deceleration time 119.00 msec    LV SEPTAL E/E' RATIO 17.00 m/s    LV LATERAL E/E' RATIO 11.33 m/s    LVOT peak mikie 0.70 m/s    Left Ventricular Outflow Tract Mean Velocity 0.44 cm/s    Left Ventricular Outflow Tract Mean Gradient 1.00 mmHg    RVDD 3.35 cm    RV mid diameter 4.25 cm    TAPSE 1.74 cm    LA size 4.00 cm    LA volume (mod) 82.80 cm3    LA Volume Index (Mod) 45.2 mL/m2    RA Major Axis 4.76 cm    RA Width 4.54 cm    AV mean gradient 2 mmHg    AV peak gradient 4 mmHg    Ao peak mikie 0.97 m/s    Ao VTI 14.60 cm    LVOT peak VTI 9.70 cm    AV valve area 1.88 cm²    AV Velocity Ratio 0.72     AV index (prosthetic) 0.66     HOWARD by Velocity Ratio 2.05 cm²    MV mean gradient 2 mmHg    MV peak gradient 3 mmHg    MV valve area by continuity eq 2.48 cm2    MV VTI 11.1 cm    Triscuspid Valve Regurgitation Peak Gradient 42 mmHg    Mean e' 0.05 m/s    ZLVIDS 4.55     ZLVIDD 2.39    APTT    Collection Time: 10/31/23  3:39 PM   Result Value Ref Range    PTT 29.7 23.2 - 33.7 seconds   Protime-INR    Collection Time: 10/31/23  3:39 PM   Result Value Ref Range    PT 15.5 (H) 12.5 - 14.5 seconds    INR 1.2 <=1.3   CBC with Differential    Collection Time: 10/31/23  3:39 PM   Result Value Ref Range    WBC 16.79 (H) 4.50 - 11.50 x10(3)/mcL    RBC 5.29 4.70 - 6.10 x10(6)/mcL    Hgb 15.6 14.0 - 18.0 g/dL    Hct 49.4 42.0 - 52.0 %    MCV 93.4 80.0 - 94.0 fL    MCH 29.5 27.0 - 31.0 pg    MCHC 31.6  (L) 33.0 - 36.0 g/dL    RDW 15.8 11.5 - 17.0 %    Platelet 136 130 - 400 x10(3)/mcL    MPV 11.8 (H) 7.4 - 10.4 fL    Neut % 85.1 %    Lymph % 6.1 %    Mono % 6.4 %    Eos % 1.0 %    Basophil % 0.3 %    Lymph # 1.03 0.6 - 4.6 x10(3)/mcL    Neut # 14.30 (H) 2.1 - 9.2 x10(3)/mcL    Mono # 1.07 0.1 - 1.3 x10(3)/mcL    Eos # 0.16 0 - 0.9 x10(3)/mcL    Baso # 0.05 <=0.2 x10(3)/mcL    IG# 0.18 (H) 0 - 0.04 x10(3)/mcL    IG% 1.1 %    NRBC% 0.0 %   PTT Heparin Monitoring    Collection Time: 10/31/23 11:42 PM   Result Value Ref Range    PTT Heparin Monitor 85.5 (H) 23.2 - 33.7 seconds   Basic Metabolic Panel    Collection Time: 11/01/23  4:19 AM   Result Value Ref Range    Sodium Level 139 136 - 145 mmol/L    Potassium Level 3.4 (L) 3.5 - 5.1 mmol/L    Chloride 100 98 - 107 mmol/L    Carbon Dioxide 27 23 - 31 mmol/L    Glucose Level 99 82 - 115 mg/dL    Blood Urea Nitrogen 26.7 (H) 8.4 - 25.7 mg/dL    Creatinine 0.87 0.73 - 1.18 mg/dL    BUN/Creatinine Ratio 31     Calcium Level Total 7.9 (L) 8.8 - 10.0 mg/dL    Anion Gap 12.0 mEq/L    eGFR >60 mls/min/1.73/m2   Magnesium    Collection Time: 11/01/23  4:19 AM   Result Value Ref Range    Magnesium Level 1.30 (L) 1.60 - 2.60 mg/dL   CBC with Differential    Collection Time: 11/01/23  4:19 AM   Result Value Ref Range    WBC 22.08 (H) 4.50 - 11.50 x10(3)/mcL    RBC 5.25 4.70 - 6.10 x10(6)/mcL    Hgb 15.4 14.0 - 18.0 g/dL    Hct 48.5 42.0 - 52.0 %    MCV 92.4 80.0 - 94.0 fL    MCH 29.3 27.0 - 31.0 pg    MCHC 31.8 (L) 33.0 - 36.0 g/dL    RDW 15.4 11.5 - 17.0 %    Platelet 126 (L) 130 - 400 x10(3)/mcL    MPV 12.3 (H) 7.4 - 10.4 fL    Neut % 82.6 %    Lymph % 6.4 %    Mono % 8.0 %    Eos % 1.2 %    Basophil % 0.4 %    Lymph # 1.42 0.6 - 4.6 x10(3)/mcL    Neut # 18.23 (H) 2.1 - 9.2 x10(3)/mcL    Mono # 1.77 (H) 0.1 - 1.3 x10(3)/mcL    Eos # 0.26 0 - 0.9 x10(3)/mcL    Baso # 0.08 <=0.2 x10(3)/mcL    IG# 0.32 (H) 0 - 0.04 x10(3)/mcL    IG% 1.4 %    NRBC% 0.0 %    IPF 15.3 (H) 0.9 - 11.2  %   APTT    Collection Time: 11/01/23  8:03 AM   Result Value Ref Range    PTT 64.7 (H) 23.2 - 33.7 seconds   PTT Heparin Monitoring    Collection Time: 11/01/23  8:03 AM   Result Value Ref Range    PTT Heparin Monitor 63.7 (H) 23.2 - 33.7 seconds       Physical Exam  Constitutional:       General: He is not in acute distress.     Appearance: Normal appearance. He is normal weight.   HENT:      Head: Normocephalic and atraumatic.   Eyes:      Extraocular Movements: Extraocular movements intact.      Pupils: Pupils are equal, round, and reactive to light.   Cardiovascular:      Rate and Rhythm: Normal rate and regular rhythm.      Pulses: Normal pulses.      Heart sounds: Normal heart sounds.   Pulmonary:      Effort: Pulmonary effort is normal. No respiratory distress.      Breath sounds: Wheezing and rales present.   Abdominal:      General: Bowel sounds are normal.      Palpations: Abdomen is soft. There is mass.      Hernia: A hernia is present.      Comments: Large left-sided inguinal hernia present with tenderness to palpation   Musculoskeletal:         General: No swelling. Normal range of motion.      Cervical back: Normal range of motion and neck supple.      Right lower leg: No edema.      Left lower leg: No edema.   Skin:     General: Skin is warm and dry.   Neurological:      General: No focal deficit present.      Mental Status: He is alert and oriented to person, place, and time.   Psychiatric:         Mood and Affect: Mood normal.         Behavior: Behavior normal.         Current Inpatient Medications:    Current Facility-Administered Medications:     acetaminophen tablet 650 mg, 650 mg, Oral, Q8H PRN, Yovani Darling MD, 650 mg at 10/31/23 1018    acetaminophen tablet 650 mg, 650 mg, Oral, Q8H PRN, Yovani Darling MD, 650 mg at 10/31/23 0306    albuterol-ipratropium 2.5 mg-0.5 mg/3 mL nebulizer solution 3 mL, 3 mL, Nebulization, Q4H PRN, Yovani Darling MD    azithromycin 500 mg  in dextrose 5 % 250 mL IVPB (ready to mix), 500 mg, Intravenous, Q24H, Yovani Darling MD, Stopped at 10/31/23 2324    furosemide injection 40 mg, 40 mg, Intravenous, Q12H, Yovani Darling MD, 40 mg at 11/01/23 0511    heparin 25,000 units in dextrose 5% (100 units/ml) IV bolus from bag - ADDITIONAL PRN BOLUS - 30 units/kg, 30 Units/kg (Adjusted), Intravenous, PRN, Alexia Montenegro MD    heparin 25,000 units in dextrose 5% (100 units/ml) IV bolus from bag - ADDITIONAL PRN BOLUS - 60 units/kg, 60 Units/kg (Adjusted), Intravenous, PRN, Alexia Montenegro MD    heparin 25,000 units in dextrose 5% 250 mL (100 units/mL) infusion HIGH INTENSITY nomogram - [facility], 0-40 Units/kg/hr (Adjusted), Intravenous, Continuous, Alexia Montenegro MD, Last Rate: 12.3 mL/hr at 11/01/23 0834, 18 Units/kg/hr at 11/01/23 0834    HYDROcodone-acetaminophen  mg per tablet 1 tablet, 1 tablet, Oral, Q6H PRN, David Goldberg MD, 1 tablet at 11/01/23 0815    magnesium sulfate 2g in water 50mL IVPB (premix), 2 g, Intravenous, Once, David Goldberg MD    melatonin tablet 6 mg, 6 mg, Oral, Nightly PRN, Yovani Darling MD    piperacillin-tazobactam (ZOSYN) 4.5 g in dextrose 5 % in water (D5W) 100 mL IVPB (MB+), 4.5 g, Intravenous, Q8H, Yovani Darling MD, Stopped at 11/01/23 0540    sodium chloride 0.9% flush 10 mL, 10 mL, Intravenous, PRN, Yovani Darling MD    sodium chloride 0.9% flush 10 mL, 10 mL, Intravenous, PRN, Yovani Darling MD    warfarin (COUMADIN) tablet 5 mg, 5 mg, Oral, Daily, Alexia Montenegro MD    VTE Risk Mitigation (From admission, onward)           Ordered     warfarin (COUMADIN) tablet 5 mg  Daily         11/01/23 0826     heparin 25,000 units in dextrose 5% (100 units/ml) IV bolus from bag - ADDITIONAL PRN BOLUS - 60 units/kg  As needed (PRN)        Question:  Heparin Infusion Adjustment (DO NOT MODIFY ANSWER)  Answer:  [email or web address]\Images\Pharmacy\HeparinInfusions\heparin HIGH  INTENSITY nomogram for [facility] [id number]    10/31/23 1455     heparin 25,000 units in dextrose 5% (100 units/ml) IV bolus from bag - ADDITIONAL PRN BOLUS - 30 units/kg  As needed (PRN)        Question:  Heparin Infusion Adjustment (DO NOT MODIFY ANSWER)  Answer:  \\[email or web address]\Images\Pharmacy\HeparinInfusions\heparin HIGH INTENSITY nomogram for [facility] [email or web address]    10/31/23 1455     heparin 25,000 units in dextrose 5% 250 mL (100 units/mL) infusion HIGH INTENSITY nomogram - [facility]  Continuous        Question:  Begin at (units/kg/hr)  Answer:  18    10/31/23 1455     IP VTE HIGH RISK PATIENT  Once         10/30/23 2050     Place sequential compression device  Until discontinued         10/30/23 2050                    Assessment:     Acute hypoxemic respiratory failure  Community-acquired pneumonia   Decompensated systolic congestive CHF   LV mural thrombus   NSTEMI/type 2 demand ischemia 2/2 above  Left groin inguinal hernia  CHF, documented LVEF less than 20%, 10/2022 s/p ICD   CAD s/p PCI 10/2022  Essential hypertension   COPD, not on home oxygen   Lung cancer s/p XRT   Colon cancer, status post colectomy    Plan:     -continue heparin and warfarin for anticoagulation in setting of LV thrombus, goal INR 2-3.  Will check in AM and make adjustments as needed   -starting carvedilol 3.125mg. Can consider restarting entresto 24/26 tomorrow per home regimen for GDMT if blood pressure permits.   -urine output 4.325L over last 24 hrs.    -continue lasix as tolerated, can consider decreasing to 40mg daily as patient appears clinically euvolemic.   -other care per primary team     Alexia Montenegro MD  Cardiology  [facility] - 4th Floor Medical Telemetry  11/01/2023       Attending physician note  I have rounded with medical resident. I personally reviewed case related differential diagnoses, assessment and plan. A thorough physical examination noted above is performed by me. I have personally reviewed the labs,  test results and medication lists that are currently available.  See above MDM formulated by me (Spencer Pratt MD):   Good urine output.  Continue Lasix.  Follow up electrolytes closely.    All of the patient's and/or family's questions have been addressed and answered to the best of my ability.

## 2023-11-01 NOTE — PROGRESS NOTES
Inpatient Nutrition Assessment    Admit Date: 10/30/2023   Total duration of encounter: 2 days   Patient Age: 63 y.o.    Nutrition Recommendation/Prescription     Continue oral diet as tolerated.  Add Boost Very High Calorie (provides 530 kcal, 22 g protein per serving) BID.  Encouraged intake.    Communication of Recommendations: reviewed with patient    Nutrition Assessment     Malnutrition Assessment/Nutrition-Focused Physical Exam    Malnutrition Context: acute illness or injury (11/01/23 1354)  Malnutrition Level: moderate (11/01/23 1354)  Energy Intake (Malnutrition): less than or equal to 50% for greater than or equal to 5 days (11/01/23 1354)  Weight Loss (Malnutrition):  (does not meet criteria) (11/01/23 1354)  Subcutaneous Fat (Malnutrition): moderate depletion (11/01/23 1354)  Orbital Region (Subcutaneous Fat Loss): moderate depletion  Upper Arm Region (Subcutaneous Fat Loss): moderate depletion     Muscle Mass (Malnutrition): moderate depletion (11/01/23 1354)  Oriental orthodox Region (Muscle Loss): moderate depletion  Clavicle Bone Region (Muscle Loss): moderate depletion                    Fluid Accumulation (Malnutrition): mild (11/01/23 1354)        A minimum of two characteristics is recommended for diagnosis of either severe or non-severe malnutrition.    Chart Review    Reason Seen: continuous nutrition monitoring    Malnutrition Screening Tool Results   Have you recently lost weight without trying?: No  Have you been eating poorly because of a decreased appetite?: No   MST Score: 0   Diagnosis:  Multifactorial acute hypoxemic respiratory failure  Community-acquired pneumonia contributing to above  Decompensated systolic congestive CHF contributing to above  LV thrombus  NSTEMI/type 2 demand ischemia 2/2 above  Left groin inguinal hernia  CHF, documented LVEF less than 20%, 10/2022 s/p ICD   CAD s/p PCI 10/2022  Essential hypertension   COPD, not on home oxygen   Lung cancer  Colon cancer, status post  "colectomy    Relevant Medical History:  CHF with EF less than 20% status post ICD, CAD status post PCI 2022, essential hypertension, COPD, lung cancer status post XRT, colon cancer status post colectomy     Scheduled Medications:  azithromycin, 500 mg, Q24H  carvediloL, 3.125 mg, BID  furosemide (LASIX) injection, 40 mg, Q12H  piperacillin-tazobactam (Zosyn) IV (PEDS and ADULTS) (extended infusion is not appropriate), 4.5 g, Q8H  warfarin, 5 mg, Daily    Continuous Infusions:  heparin (porcine) in D5W, Last Rate: 20 Units/kg/hr (11/01/23 0910)    PRN Medications: acetaminophen, acetaminophen, albuterol-ipratropium, heparin (PORCINE), heparin (PORCINE), HYDROcodone-acetaminophen, melatonin, sodium chloride 0.9%, sodium chloride 0.9%    Calorie Containing IV Medications: no significant kcals from medications at this time    Recent Labs   Lab 10/30/23  1514 10/31/23  0244 10/31/23  1539 11/01/23  0419    144  --  139   K 4.0 4.3  --  3.4*   CALCIUM 8.4* 8.6*  --  7.9*   MG  --   --   --  1.30*   CHLORIDE 108* 107  --  100   CO2 24 28  --  27   BUN 38.6* 33.1*  --  26.7*   CREATININE 1.05 1.10  --  0.87   EGFRNORACEVR >60 >60  --  >60   GLUCOSE 97 103  --  99   BILITOT 1.4 1.3  --   --    ALKPHOS 136 114  --   --    * 99*  --   --    AST 82* 62*  --   --    ALBUMIN 3.1* 2.8*  --   --    HGB 15.0 15.4 15.6 15.4   HCT 47.6 48.0 49.4 48.5     Nutrition Orders: Diet heart healthy  Appetite/Oral Intake: good/% of meals  Factors Affecting Nutritional Intake: none identified  Food/Sikhism/Cultural Preferences: none reported  Food Allergies: none reported  Wound(s): no pressure injuries documented at this time, colostomy to RLQ documented in EMR  Last Bowel Movement: 10/30/23    Comments    11/1/23 Patient reports appetite is better/good, poor intake for 5-6 days prior, agreeable to oral supplement (any flavor), denies weight loss.    Anthropometrics    Height: 5' 8.9" (175 cm), Height Method: Stated  Last " Weight: 73.4 kg (161 lb 13.1 oz) (10/31/23 1537), Weight Method: Bed Scale  BMI (Calculated): 24  BMI Classification: normal (BMI 18.5-24.9)        Ideal Body Weight (IBW), Male: 159.4 lb     % Ideal Body Weight, Male (lb): 101.52 %                 Usual Body Weight (UBW), k.5 kg-73.0 kg  % Usual Body Weight: 107.38     Usual Weight Provided By: patient    Wt Readings from Last 5 Encounters:   10/31/23 73.4 kg (161 lb 13.1 oz)     Weight Change(s) Since Admission: 4.9 kg weight change, suspect fluid-related  Wt Readings from Last 1 Encounters:   10/31/23 1537 73.4 kg (161 lb 13.1 oz)   10/31/23 1202 68.5 kg (151 lb)   10/30/23 181 68.5 kg (151 lb)   Admit Weight: 68.5 kg (151 lb) (10/30/23 181), Weight Method: Bed Scale    Estimated Needs    Weight Used For Calorie Calculations: 70 kg (154 lb 5.2 oz)  Energy Calorie Requirements (kcal): 2070, 1.4 stress factor  Energy Need Method: [place]-St Jeor  Weight Used For Protein Calculations: 70 kg (154 lb 5.2 oz)  Protein Requirements: 105 g, 1.5 g/kg  Fluid Requirements (mL): 1750, 25 ml/kg    Enteral Nutrition Patient not receiving enteral nutrition at this time.    Parenteral Nutrition Patient not receiving parenteral nutrition support at this time.    Evaluation of Received Nutrient Intake    Calories: meeting estimated needs  Protein: meeting estimated needs    Patient Education Not applicable.    Nutrition Diagnosis     PES: Moderate acute disease or injury related malnutrition related to catabolic illness as evidenced by less than or equal to 50% needs met for greater than or equal to 5 days, moderate fat depletion, moderate muscle depletion, and edema. (new)    Interventions/Goals     Intervention(s): general/healthful diet, commercial beverage, and collaboration with other providers    Goal (1): Meet greater than 75% of nutritional needs by follow-up. (new)  Goal (2): Consume % of oral supplements by follow-up. (new)    Monitoring & Evaluation      Dietitian will monitor food and beverage intake, energy intake, weight, weight change, electrolyte/renal panel, glucose/endocrine profile, and gastrointestinal profile.    Nutrition Risk/Follow-Up: moderate (follow-up in 3-5 days)   Please consult if re-assessment needed sooner.

## 2023-11-02 LAB
ABORH RETYPE: NORMAL
ALBUMIN SERPL-MCNC: 2.7 G/DL (ref 3.4–4.8)
ALBUMIN/GLOB SERPL: 1 RATIO (ref 1.1–2)
ALP SERPL-CCNC: 144 UNIT/L (ref 40–150)
ALT SERPL-CCNC: 55 UNIT/L (ref 0–55)
AST SERPL-CCNC: 37 UNIT/L (ref 5–34)
BACTERIA SPEC CULT: ABNORMAL
BASOPHILS # BLD AUTO: 0.11 X10(3)/MCL
BASOPHILS NFR BLD AUTO: 0.4 %
BILIRUB SERPL-MCNC: 1.6 MG/DL
BUN SERPL-MCNC: 21 MG/DL (ref 8.4–25.7)
CALCIUM SERPL-MCNC: 7.8 MG/DL (ref 8.8–10)
CHLORIDE SERPL-SCNC: 88 MMOL/L (ref 98–107)
CO2 SERPL-SCNC: 33 MMOL/L (ref 23–31)
CREAT SERPL-MCNC: 0.89 MG/DL (ref 0.73–1.18)
EOSINOPHIL # BLD AUTO: 0.12 X10(3)/MCL (ref 0–0.9)
EOSINOPHIL NFR BLD AUTO: 0.5 %
ERYTHROCYTE [DISTWIDTH] IN BLOOD BY AUTOMATED COUNT: 14.9 % (ref 11.5–17)
GFR SERPLBLD CREATININE-BSD FMLA CKD-EPI: >60 MLS/MIN/1.73/M2
GLOBULIN SER-MCNC: 2.6 GM/DL (ref 2.4–3.5)
GLUCOSE SERPL-MCNC: 91 MG/DL (ref 82–115)
GRAM STN SPEC: ABNORMAL
GROUP & RH: NORMAL
HCT VFR BLD AUTO: 43 % (ref 42–52)
HCT VFR BLD AUTO: 47.7 % (ref 42–52)
HGB BLD-MCNC: 13.8 G/DL (ref 14–18)
HGB BLD-MCNC: 15.3 G/DL (ref 14–18)
IMM GRANULOCYTES # BLD AUTO: 0.31 X10(3)/MCL (ref 0–0.04)
IMM GRANULOCYTES NFR BLD AUTO: 1.2 %
INDIRECT COOMBS GEL: NORMAL
INR PPP: 1.3
LACTATE SERPL-SCNC: 1.1 MMOL/L (ref 0.5–2.2)
LYMPHOCYTES # BLD AUTO: 0.99 X10(3)/MCL (ref 0.6–4.6)
LYMPHOCYTES NFR BLD AUTO: 3.9 %
MAGNESIUM SERPL-MCNC: 1.5 MG/DL (ref 1.6–2.6)
MCH RBC QN AUTO: 29.5 PG (ref 27–31)
MCHC RBC AUTO-ENTMCNC: 32.1 G/DL (ref 33–36)
MCV RBC AUTO: 91.9 FL (ref 80–94)
MONOCYTES # BLD AUTO: 1.11 X10(3)/MCL (ref 0.1–1.3)
MONOCYTES NFR BLD AUTO: 4.4 %
NEUTROPHILS # BLD AUTO: 22.75 X10(3)/MCL (ref 2.1–9.2)
NEUTROPHILS NFR BLD AUTO: 89.6 %
NRBC BLD AUTO-RTO: 0 %
PLATELET # BLD AUTO: 133 X10(3)/MCL (ref 130–400)
PMV BLD AUTO: 12.6 FL (ref 7.4–10.4)
POTASSIUM SERPL-SCNC: 3.3 MMOL/L (ref 3.5–5.1)
PROT SERPL-MCNC: 5.3 GM/DL (ref 5.8–7.6)
PROTHROMBIN TIME: 15.7 SECONDS (ref 12.5–14.5)
RBC # BLD AUTO: 5.19 X10(6)/MCL (ref 4.7–6.1)
SODIUM SERPL-SCNC: 134 MMOL/L (ref 136–145)
SPECIMEN OUTDATE: NORMAL
WBC # SPEC AUTO: 25.39 X10(3)/MCL (ref 4.5–11.5)

## 2023-11-02 PROCEDURE — 83605 ASSAY OF LACTIC ACID: CPT | Performed by: STUDENT IN AN ORGANIZED HEALTH CARE EDUCATION/TRAINING PROGRAM

## 2023-11-02 PROCEDURE — 85014 HEMATOCRIT: CPT | Performed by: SURGERY

## 2023-11-02 PROCEDURE — 25000003 PHARM REV CODE 250: Performed by: HOSPITALIST

## 2023-11-02 PROCEDURE — 99900035 HC TECH TIME PER 15 MIN (STAT)

## 2023-11-02 PROCEDURE — 21400001 HC TELEMETRY ROOM

## 2023-11-02 PROCEDURE — 94761 N-INVAS EAR/PLS OXIMETRY MLT: CPT

## 2023-11-02 PROCEDURE — 85025 COMPLETE CBC W/AUTO DIFF WBC: CPT | Performed by: HOSPITALIST

## 2023-11-02 PROCEDURE — 63600175 PHARM REV CODE 636 W HCPCS: Performed by: INTERNAL MEDICINE

## 2023-11-02 PROCEDURE — 85610 PROTHROMBIN TIME: CPT

## 2023-11-02 PROCEDURE — 25000003 PHARM REV CODE 250: Performed by: INTERNAL MEDICINE

## 2023-11-02 PROCEDURE — 83735 ASSAY OF MAGNESIUM: CPT | Performed by: HOSPITALIST

## 2023-11-02 PROCEDURE — 80053 COMPREHEN METABOLIC PANEL: CPT | Performed by: HOSPITALIST

## 2023-11-02 PROCEDURE — 25000003 PHARM REV CODE 250

## 2023-11-02 PROCEDURE — 86901 BLOOD TYPING SEROLOGIC RH(D): CPT | Performed by: HOSPITALIST

## 2023-11-02 PROCEDURE — 27000221 HC OXYGEN, UP TO 24 HOURS

## 2023-11-02 RX ORDER — FUROSEMIDE 10 MG/ML
40 INJECTION INTRAMUSCULAR; INTRAVENOUS DAILY
Status: DISCONTINUED | OUTPATIENT
Start: 2023-11-03 | End: 2023-11-02

## 2023-11-02 RX ORDER — MAGNESIUM SULFATE HEPTAHYDRATE 40 MG/ML
2 INJECTION, SOLUTION INTRAVENOUS ONCE
Status: COMPLETED | OUTPATIENT
Start: 2023-11-02 | End: 2023-11-04

## 2023-11-02 RX ADMIN — HYDROCODONE BITARTRATE AND ACETAMINOPHEN 1 TABLET: 10; 325 TABLET ORAL at 09:11

## 2023-11-02 RX ADMIN — PIPERACILLIN AND TAZOBACTAM 4.5 G: 4; .5 INJECTION, POWDER, LYOPHILIZED, FOR SOLUTION INTRAVENOUS; PARENTERAL at 08:11

## 2023-11-02 RX ADMIN — CARVEDILOL 3.12 MG: 3.12 TABLET, FILM COATED ORAL at 08:11

## 2023-11-02 RX ADMIN — HYDROCODONE BITARTRATE AND ACETAMINOPHEN 1 TABLET: 10; 325 TABLET ORAL at 03:11

## 2023-11-02 RX ADMIN — AZITHROMYCIN MONOHYDRATE 500 MG: 500 INJECTION, POWDER, LYOPHILIZED, FOR SOLUTION INTRAVENOUS at 09:11

## 2023-11-02 RX ADMIN — HYDROCODONE BITARTRATE AND ACETAMINOPHEN 1 TABLET: 10; 325 TABLET ORAL at 08:11

## 2023-11-02 NOTE — NURSING
"Pt called to be cleaned r/t incontinence. Moderate amount of kristin red blood noted to be coming from rectum, pt has an established colostomy with no output since PM shift 10/31. Heparin gtt stopped at 1930. VS stable, pt aaox4, pt c/o 6/10 LLQ pain pointing to his hernia, bowel sounds sluggish. Hospitalist notified, orders obtained - stop heparin gtt, notify cardiology, stat CT abd with contrast, stat labs. Cardiology notified, orders obtained - "restart heparin gtt when okayed by primary service or GI"   "

## 2023-11-02 NOTE — PROGRESS NOTES
[facility] - 4th Floor Medical Telemetry    Cardiology  Progress Note    Patient Name: Ye Vergara  MRN: [id number]  Admission Date: 10/30/2023  Hospital Length of Stay: 3 days  Code Status: Full Code   Attending Physician: Germán Benoit MD   Primary Care Physician: Aaron Weeks MD  Expected Discharge Date:   Principal Problem:CAP (community acquired pneumonia)    Subjective:     Brief HPI:   Mr. Vergara is a 64 y/o male who initially presented to the ED 10/30/2023 with complaints of dyspnea on exertion and productive cough with green sputum.  Per chart review it appears patient was recently admitted at [facility] for sepsis from CAP but left AMA 2 days ago.  Lab work on admission was notable for leukocytosis with WBC 15, BNP 3500, elevated trop of 0.085.  CXR performed showing ill-defined bilateral mid/lower zone hazy infiltrates concerning for developing atypical process.  Patient has known history of HF with EF 20%.  Patient was given lasix 40mg BID with good urine output.  Cardiology was consulted in setting of CHF exacerbation and NSTEMI.      Hospital Course:   Echo w/ EF 15-20% and LV mural thrombus noted - started on heparin gtt 10/31/2023 and coumadin on 11/1/2023 - goal INR 2-3  Urine output 3.260L over 24 hours  Remains on 4L NC   Episode of bleeding from rectum overnight, heparin gtt currently being held.  GI consulted overnight and CT AP performed showing signs of inflammation and possible mass infiltrate at area of proximal sigmoid colon.   Patient eating this AM on exam, denies any chest pain or shortness of breath.  Continues to complain of pain near hernia site.  No new episodes of bleeding reported, H/H stable.       Review of Systems   Cardiovascular:  Negative for chest pain, leg swelling and palpitations.   Respiratory:  Positive for cough, shortness of breath and sputum production.    Gastrointestinal:  Negative for nausea and vomiting.       Objective:     Vital  Signs (Most Recent):  Temp: 97.6 °F (36.4 °C) (11/02/23 0809)  Pulse: 92 (11/02/23 0809)  Resp: 18 (11/02/23 0819)  BP: 101/72 (11/02/23 0809)  SpO2: (!) 90 % (11/02/23 0332) Vital Signs (24h Range):  Temp:  [97.3 °F (36.3 °C)-97.6 °F (36.4 °C)] 97.6 °F (36.4 °C)  Pulse:  [] 92  Resp:  [17-20] 18  SpO2:  [90 %-100 %] 90 %  BP: ()/(63-84) 101/72     Weight: 73.4 kg (161 lb 13.1 oz)  Body mass index is 23.97 kg/m².    SpO2: (!) 90 %         Intake/Output Summary (Last 24 hours) at 11/2/2023 0954  Last data filed at 11/2/2023 0345  Gross per 24 hour   Intake --   Output 2250 ml   Net -2250 ml         Lines/Drains/Airways       Drain  Duration                  Colostomy RLQ -- days              Peripheral Intravenous Line  Duration                  Peripheral IV - Single Lumen 10/31/23 0747 20 G Left Antecubital 2 days         Peripheral IV - Single Lumen 10/31/23 1736 20 G Anterior;Right Forearm 1 day                    Significant Labs:   Recent Results (from the past 72 hour(s))   Comprehensive Metabolic Panel    Collection Time: 10/30/23  3:14 PM   Result Value Ref Range    Sodium Level 141 136 - 145 mmol/L    Potassium Level 4.0 3.5 - 5.1 mmol/L    Chloride 108 (H) 98 - 107 mmol/L    Carbon Dioxide 24 23 - 31 mmol/L    Glucose Level 97 82 - 115 mg/dL    Blood Urea Nitrogen 38.6 (H) 8.4 - 25.7 mg/dL    Creatinine 1.05 0.73 - 1.18 mg/dL    Calcium Level Total 8.4 (L) 8.8 - 10.0 mg/dL    Protein Total 6.1 5.8 - 7.6 gm/dL    Albumin Level 3.1 (L) 3.4 - 4.8 g/dL    Globulin 3.0 2.4 - 3.5 gm/dL    Albumin/Globulin Ratio 1.0 (L) 1.1 - 2.0 ratio    Bilirubin Total 1.4 <=1.5 mg/dL    Alkaline Phosphatase 136 40 - 150 unit/L    Alanine Aminotransferase 122 (H) 0 - 55 unit/L    Aspartate Aminotransferase 82 (H) 5 - 34 unit/L    eGFR >60 mls/min/1.73/m2   BNP    Collection Time: 10/30/23  3:14 PM   Result Value Ref Range    Natriuretic Peptide 3,544.2 (H) <=100.0 pg/mL   Troponin I    Collection Time: 10/30/23   3:14 PM   Result Value Ref Range    Troponin-I 0.085 (H) 0.000 - 0.045 ng/mL   Blood Culture    Collection Time: 10/30/23  3:14 PM    Specimen: Blood   Result Value Ref Range    CULTURE, BLOOD ([facility]) No Growth At 48 Hours    Blood Culture    Collection Time: 10/30/23  3:14 PM    Specimen: Blood   Result Value Ref Range    CULTURE, BLOOD ([facility]) No Growth At 48 Hours    Lactic Acid, Plasma    Collection Time: 10/30/23  3:14 PM   Result Value Ref Range    Lactic Acid Level 1.5 0.5 - 2.2 mmol/L   CBC with Differential    Collection Time: 10/30/23  3:14 PM   Result Value Ref Range    WBC 15.27 (H) 4.50 - 11.50 x10(3)/mcL    RBC 5.10 4.70 - 6.10 x10(6)/mcL    Hgb 15.0 14.0 - 18.0 g/dL    Hct 47.6 42.0 - 52.0 %    MCV 93.3 80.0 - 94.0 fL    MCH 29.4 27.0 - 31.0 pg    MCHC 31.5 (L) 33.0 - 36.0 g/dL    RDW 15.7 11.5 - 17.0 %    Platelet 129 (L) 130 - 400 x10(3)/mcL    MPV 12.0 (H) 7.4 - 10.4 fL    Neut % 88.4 %    Lymph % 4.0 %    Mono % 5.6 %    Eos % 0.3 %    Basophil % 0.4 %    Lymph # 0.61 0.6 - 4.6 x10(3)/mcL    Neut # 13.51 (H) 2.1 - 9.2 x10(3)/mcL    Mono # 0.85 0.1 - 1.3 x10(3)/mcL    Eos # 0.04 0 - 0.9 x10(3)/mcL    Baso # 0.06 <=0.2 x10(3)/mcL    IG# 0.20 (H) 0 - 0.04 x10(3)/mcL    IG% 1.3 %    NRBC% 0.1 %   COVID/RSV/FLU A&B PCR    Collection Time: 10/30/23  5:21 PM   Result Value Ref Range    Influenza A PCR Not Detected Not Detected    Influenza B PCR Not Detected Not Detected    Respiratory Syncytial Virus PCR Not Detected Not Detected    SARS-CoV-2 PCR Not Detected Not Detected, Negative, Invalid   Troponin I    Collection Time: 10/30/23  8:43 PM   Result Value Ref Range    Troponin-I 0.047 (H) 0.000 - 0.045 ng/mL   Comprehensive Metabolic Panel    Collection Time: 10/31/23  2:44 AM   Result Value Ref Range    Sodium Level 144 136 - 145 mmol/L    Potassium Level 4.3 3.5 - 5.1 mmol/L    Chloride 107 98 - 107 mmol/L    Carbon Dioxide 28 23 - 31 mmol/L    Glucose Level 103 82 - 115 mg/dL    Blood Urea Nitrogen 33.1  (H) 8.4 - 25.7 mg/dL    Creatinine 1.10 0.73 - 1.18 mg/dL    Calcium Level Total 8.6 (L) 8.8 - 10.0 mg/dL    Protein Total 6.0 5.8 - 7.6 gm/dL    Albumin Level 2.8 (L) 3.4 - 4.8 g/dL    Globulin 3.2 2.4 - 3.5 gm/dL    Albumin/Globulin Ratio 0.9 (L) 1.1 - 2.0 ratio    Bilirubin Total 1.3 <=1.5 mg/dL    Alkaline Phosphatase 114 40 - 150 unit/L    Alanine Aminotransferase 99 (H) 0 - 55 unit/L    Aspartate Aminotransferase 62 (H) 5 - 34 unit/L    eGFR >60 mls/min/1.73/m2   Troponin I    Collection Time: 10/31/23  2:44 AM   Result Value Ref Range    Troponin-I 0.046 (H) 0.000 - 0.045 ng/mL   CBC with Differential    Collection Time: 10/31/23  2:44 AM   Result Value Ref Range    WBC 16.38 (H) 4.50 - 11.50 x10(3)/mcL    RBC 5.19 4.70 - 6.10 x10(6)/mcL    Hgb 15.4 14.0 - 18.0 g/dL    Hct 48.0 42.0 - 52.0 %    MCV 92.5 80.0 - 94.0 fL    MCH 29.7 27.0 - 31.0 pg    MCHC 32.1 (L) 33.0 - 36.0 g/dL    RDW 15.6 11.5 - 17.0 %    Platelet 136 130 - 400 x10(3)/mcL    MPV 12.0 (H) 7.4 - 10.4 fL    Neut % 87.0 %    Lymph % 4.6 %    Mono % 6.5 %    Eos % 0.5 %    Basophil % 0.4 %    Lymph # 0.76 0.6 - 4.6 x10(3)/mcL    Neut # 14.24 (H) 2.1 - 9.2 x10(3)/mcL    Mono # 1.06 0.1 - 1.3 x10(3)/mcL    Eos # 0.09 0 - 0.9 x10(3)/mcL    Baso # 0.07 <=0.2 x10(3)/mcL    IG# 0.16 (H) 0 - 0.04 x10(3)/mcL    IG% 1.0 %    NRBC% 0.1 %   Respiratory Culture    Collection Time: 10/31/23  6:26 AM    Specimen: Sputum, Expectorated   Result Value Ref Range    Respiratory Culture Moderate Yeast (A)     GRAM STAIN Quality 2+     GRAM STAIN Few Gram positive cocci     GRAM STAIN Few Gram Positive Rods     GRAM STAIN Few Yeast    MRSA PCR    Collection Time: 10/31/23  6:26 AM   Result Value Ref Range    MRSA PCR SCRN ([facility]) Not Detected Not Detected   Echo    Collection Time: 10/31/23 12:41 PM   Result Value Ref Range    BSA 1.82 m2    Guerra's Biplane MOD Ejection Fraction 17 %    LVOT stroke volume 27.49 cm3    LVIDd 6.44 (A) 3.5 - 6.0 cm    LV Systolic Volume  152.00 mL    LV Systolic Volume Index 83.1 mL/m2    LVIDs 5.57 (A) 2.1 - 4.0 cm    LV Diastolic Volume 211.00 mL    LV Diastolic Volume Index 115.30 mL/m2    IVS 0.81 0.6 - 1.1 cm    LVOT diameter 1.90 cm    LVOT area 2.8 cm2    FS 14 (A) 28 - 44 %    Left Ventricle Relative Wall Thickness 0.28 cm    Posterior Wall 0.89 0.6 - 1.1 cm    LV mass 227.12 g    LV Mass Index 124 g/m2    MV Peak E Mikie 0.68 m/s    TDI LATERAL 0.06 m/s    TDI SEPTAL 0.04 m/s    E/E' ratio 13.60 m/s    MV Peak A Mikie 0.66 m/s    TR Max Mikie 3.25 m/s    E/A ratio 1.03     E wave deceleration time 119.00 msec    LV SEPTAL E/E' RATIO 17.00 m/s    LV LATERAL E/E' RATIO 11.33 m/s    LVOT peak mikie 0.70 m/s    Left Ventricular Outflow Tract Mean Velocity 0.44 cm/s    Left Ventricular Outflow Tract Mean Gradient 1.00 mmHg    RVDD 3.35 cm    RV mid diameter 4.25 cm    TAPSE 1.74 cm    LA size 4.00 cm    LA volume (mod) 82.80 cm3    LA Volume Index (Mod) 45.2 mL/m2    RA Major Axis 4.76 cm    RA Width 4.54 cm    AV mean gradient 2 mmHg    AV peak gradient 4 mmHg    Ao peak mikie 0.97 m/s    Ao VTI 14.60 cm    LVOT peak VTI 9.70 cm    AV valve area 1.88 cm²    AV Velocity Ratio 0.72     AV index (prosthetic) 0.66     HOWARD by Velocity Ratio 2.05 cm²    MV mean gradient 2 mmHg    MV peak gradient 3 mmHg    MV valve area by continuity eq 2.48 cm2    MV VTI 11.1 cm    Triscuspid Valve Regurgitation Peak Gradient 42 mmHg    Mean e' 0.05 m/s    ZLVIDS 4.55     ZLVIDD 2.39    APTT    Collection Time: 10/31/23  3:39 PM   Result Value Ref Range    PTT 29.7 23.2 - 33.7 seconds   Protime-INR    Collection Time: 10/31/23  3:39 PM   Result Value Ref Range    PT 15.5 (H) 12.5 - 14.5 seconds    INR 1.2 <=1.3   CBC with Differential    Collection Time: 10/31/23  3:39 PM   Result Value Ref Range    WBC 16.79 (H) 4.50 - 11.50 x10(3)/mcL    RBC 5.29 4.70 - 6.10 x10(6)/mcL    Hgb 15.6 14.0 - 18.0 g/dL    Hct 49.4 42.0 - 52.0 %    MCV 93.4 80.0 - 94.0 fL    MCH 29.5 27.0 - 31.0  pg    MCHC 31.6 (L) 33.0 - 36.0 g/dL    RDW 15.8 11.5 - 17.0 %    Platelet 136 130 - 400 x10(3)/mcL    MPV 11.8 (H) 7.4 - 10.4 fL    Neut % 85.1 %    Lymph % 6.1 %    Mono % 6.4 %    Eos % 1.0 %    Basophil % 0.3 %    Lymph # 1.03 0.6 - 4.6 x10(3)/mcL    Neut # 14.30 (H) 2.1 - 9.2 x10(3)/mcL    Mono # 1.07 0.1 - 1.3 x10(3)/mcL    Eos # 0.16 0 - 0.9 x10(3)/mcL    Baso # 0.05 <=0.2 x10(3)/mcL    IG# 0.18 (H) 0 - 0.04 x10(3)/mcL    IG% 1.1 %    NRBC% 0.0 %   PTT Heparin Monitoring    Collection Time: 10/31/23 11:42 PM   Result Value Ref Range    PTT Heparin Monitor 85.5 (H) 23.2 - 33.7 seconds   Basic Metabolic Panel    Collection Time: 11/01/23  4:19 AM   Result Value Ref Range    Sodium Level 139 136 - 145 mmol/L    Potassium Level 3.4 (L) 3.5 - 5.1 mmol/L    Chloride 100 98 - 107 mmol/L    Carbon Dioxide 27 23 - 31 mmol/L    Glucose Level 99 82 - 115 mg/dL    Blood Urea Nitrogen 26.7 (H) 8.4 - 25.7 mg/dL    Creatinine 0.87 0.73 - 1.18 mg/dL    BUN/Creatinine Ratio 31     Calcium Level Total 7.9 (L) 8.8 - 10.0 mg/dL    Anion Gap 12.0 mEq/L    eGFR >60 mls/min/1.73/m2   Magnesium    Collection Time: 11/01/23  4:19 AM   Result Value Ref Range    Magnesium Level 1.30 (L) 1.60 - 2.60 mg/dL   CBC with Differential    Collection Time: 11/01/23  4:19 AM   Result Value Ref Range    WBC 22.08 (H) 4.50 - 11.50 x10(3)/mcL    RBC 5.25 4.70 - 6.10 x10(6)/mcL    Hgb 15.4 14.0 - 18.0 g/dL    Hct 48.5 42.0 - 52.0 %    MCV 92.4 80.0 - 94.0 fL    MCH 29.3 27.0 - 31.0 pg    MCHC 31.8 (L) 33.0 - 36.0 g/dL    RDW 15.4 11.5 - 17.0 %    Platelet 126 (L) 130 - 400 x10(3)/mcL    MPV 12.3 (H) 7.4 - 10.4 fL    Neut % 82.6 %    Lymph % 6.4 %    Mono % 8.0 %    Eos % 1.2 %    Basophil % 0.4 %    Lymph # 1.42 0.6 - 4.6 x10(3)/mcL    Neut # 18.23 (H) 2.1 - 9.2 x10(3)/mcL    Mono # 1.77 (H) 0.1 - 1.3 x10(3)/mcL    Eos # 0.26 0 - 0.9 x10(3)/mcL    Baso # 0.08 <=0.2 x10(3)/mcL    IG# 0.32 (H) 0 - 0.04 x10(3)/mcL    IG% 1.4 %    NRBC% 0.0 %    IPF  15.3 (H) 0.9 - 11.2 %   APTT    Collection Time: 11/01/23  8:03 AM   Result Value Ref Range    PTT 64.7 (H) 23.2 - 33.7 seconds   PTT Heparin Monitoring    Collection Time: 11/01/23  8:03 AM   Result Value Ref Range    PTT Heparin Monitor 63.7 (H) 23.2 - 33.7 seconds   PTT Heparin Monitoring    Collection Time: 11/01/23  5:01 PM   Result Value Ref Range    PTT Heparin Monitor 81.3 (H) 23.2 - 33.7 seconds   Hemoglobin and Hematocrit    Collection Time: 11/01/23  8:08 PM   Result Value Ref Range    Hgb 14.2 14.0 - 18.0 g/dL    Hct 44.7 42.0 - 52.0 %   Comprehensive Metabolic Panel    Collection Time: 11/02/23  4:11 AM   Result Value Ref Range    Sodium Level 134 (L) 136 - 145 mmol/L    Potassium Level 3.3 (L) 3.5 - 5.1 mmol/L    Chloride 88 (L) 98 - 107 mmol/L    Carbon Dioxide 33 (H) 23 - 31 mmol/L    Glucose Level 91 82 - 115 mg/dL    Blood Urea Nitrogen 21.0 8.4 - 25.7 mg/dL    Creatinine 0.89 0.73 - 1.18 mg/dL    Calcium Level Total 7.8 (L) 8.8 - 10.0 mg/dL    Protein Total 5.3 (L) 5.8 - 7.6 gm/dL    Albumin Level 2.7 (L) 3.4 - 4.8 g/dL    Globulin 2.6 2.4 - 3.5 gm/dL    Albumin/Globulin Ratio 1.0 (L) 1.1 - 2.0 ratio    Bilirubin Total 1.6 (H) <=1.5 mg/dL    Alkaline Phosphatase 144 40 - 150 unit/L    Alanine Aminotransferase 55 0 - 55 unit/L    Aspartate Aminotransferase 37 (H) 5 - 34 unit/L    eGFR >60 mls/min/1.73/m2   Magnesium    Collection Time: 11/02/23  4:11 AM   Result Value Ref Range    Magnesium Level 1.50 (L) 1.60 - 2.60 mg/dL   Protime-INR    Collection Time: 11/02/23  4:11 AM   Result Value Ref Range    PT 15.7 (H) 12.5 - 14.5 seconds    INR 1.3 <=1.3   CBC with Differential    Collection Time: 11/02/23  4:11 AM   Result Value Ref Range    WBC 25.39 (H) 4.50 - 11.50 x10(3)/mcL    RBC 5.19 4.70 - 6.10 x10(6)/mcL    Hgb 15.3 14.0 - 18.0 g/dL    Hct 47.7 42.0 - 52.0 %    MCV 91.9 80.0 - 94.0 fL    MCH 29.5 27.0 - 31.0 pg    MCHC 32.1 (L) 33.0 - 36.0 g/dL    RDW 14.9 11.5 - 17.0 %    Platelet 133 130 -  400 x10(3)/mcL    MPV 12.6 (H) 7.4 - 10.4 fL    Neut % 89.6 %    Lymph % 3.9 %    Mono % 4.4 %    Eos % 0.5 %    Basophil % 0.4 %    Lymph # 0.99 0.6 - 4.6 x10(3)/mcL    Neut # 22.75 (H) 2.1 - 9.2 x10(3)/mcL    Mono # 1.11 0.1 - 1.3 x10(3)/mcL    Eos # 0.12 0 - 0.9 x10(3)/mcL    Baso # 0.11 <=0.2 x10(3)/mcL    IG# 0.31 (H) 0 - 0.04 x10(3)/mcL    IG% 1.2 %    NRBC% 0.0 %   Type & Screen    Collection Time: 11/02/23  4:11 AM   Result Value Ref Range    Group & Rh O POS     Indirect Delmy GEL NEG     Specimen Outdate 11/05/2023 23:59    ABORH RETYPE    Collection Time: 11/02/23  7:36 AM   Result Value Ref Range    ABORH Retype O POS        Physical Exam  Constitutional:       General: He is not in acute distress.     Appearance: Normal appearance. He is normal weight.   HENT:      Head: Normocephalic and atraumatic.   Eyes:      Extraocular Movements: Extraocular movements intact.      Pupils: Pupils are equal, round, and reactive to light.   Cardiovascular:      Rate and Rhythm: Normal rate and regular rhythm.      Pulses: Normal pulses.      Heart sounds: Normal heart sounds.   Pulmonary:      Effort: Pulmonary effort is normal. No respiratory distress.      Breath sounds: Wheezing and rales present.   Abdominal:      General: Bowel sounds are normal.      Palpations: Abdomen is soft. There is mass.      Hernia: A hernia is present.      Comments: Large left-sided inguinal hernia present with tenderness to palpation   Musculoskeletal:         General: No swelling. Normal range of motion.      Cervical back: Normal range of motion and neck supple.      Right lower leg: No edema.      Left lower leg: No edema.   Skin:     General: Skin is warm and dry.   Neurological:      General: No focal deficit present.      Mental Status: He is alert and oriented to person, place, and time.   Psychiatric:         Mood and Affect: Mood normal.         Behavior: Behavior normal.         Current Inpatient Medications:    Current  Facility-Administered Medications:     acetaminophen tablet 650 mg, 650 mg, Oral, Q8H PRN, Yovani Darling MD, 650 mg at 10/31/23 1018    acetaminophen tablet 650 mg, 650 mg, Oral, Q8H PRN, Yovani Darling MD, 650 mg at 10/31/23 0306    albuterol-ipratropium 2.5 mg-0.5 mg/3 mL nebulizer solution 3 mL, 3 mL, Nebulization, Q4H PRN, Yovani Darling MD    azithromycin 500 mg in dextrose 5 % 250 mL IVPB (ready to mix), 500 mg, Intravenous, Q24H, Yovani Darling MD, Stopped at 11/02/23 0030    carvediloL tablet 3.125 mg, 3.125 mg, Oral, BID, Alexia Montenegro MD, 3.125 mg at 11/02/23 0819    furosemide injection 40 mg, 40 mg, Intravenous, Q12H, Yovani Darling MD, 40 mg at 11/01/23 2331    HYDROcodone-acetaminophen  mg per tablet 1 tablet, 1 tablet, Oral, Q6H PRN, David Goldberg MD, 1 tablet at 11/02/23 0819    melatonin tablet 6 mg, 6 mg, Oral, Nightly PRN, Yovani Darling MD    piperacillin-tazobactam (ZOSYN) 4.5 g in dextrose 5 % in water (D5W) 100 mL IVPB (MB+), 4.5 g, Intravenous, Q8H, Yovani Darling MD, Last Rate: 25 mL/hr at 11/02/23 0822, 4.5 g at 11/02/23 0822    sodium chloride 0.9% flush 10 mL, 10 mL, Intravenous, PRN, Yovani Darling MD    sodium chloride 0.9% flush 10 mL, 10 mL, Intravenous, PRN, Yovani Darling MD    VTE Risk Mitigation (From admission, onward)           Ordered     IP VTE HIGH RISK PATIENT  Once         10/30/23 2050     Place sequential compression device  Until discontinued         10/30/23 2050                    Assessment:     Acute hypoxemic respiratory failure  Community-acquired pneumonia   Decompensated systolic congestive CHF   LV mural thrombus   NSTEMI/type 2 demand ischemia 2/2 above  Left groin inguinal hernia  Leukocytosis   CHF, documented LVEF less than 20%, 10/2022 s/p ICD   CAD s/p PCI 10/2022  Essential hypertension   COPD, not on home oxygen   Lung cancer s/p XRT   Colon cancer, status post  colectomy    Plan:     -Heparin and Warfarin on hold secondary to acute GI bleed.  Restart pending GI recommendations.   -continue carvedilol 3.125mg. Can consider restarting entresto 24/26 per home regimen for GDMT if blood pressure permits.   -urine output 3.260L over last 24 hrs.    -continue lasix as tolerated, decreasing to 40mg daily   -other care per primary team     Alexia Montenegro MD  Cardiology  [facility] - 4th Floor Medical Telemetry  11/02/2023       Attending physician note  I have rounded with medical resident. I personally reviewed case related differential diagnoses, assessment and plan. A thorough physical examination noted above is performed by me. I have personally reviewed the labs, test results and medication lists that are currently available.  See above MDM formulated by me (Spencer Pratt MD):     All of the patient's and/or family's questions have been addressed and answered to the best of my ability.

## 2023-11-02 NOTE — CARE UPDATE
Notified by nurse that patient c/o abdominal pain and passing a moderate amount of bright red blood from rectum. Colostomy output has been decreased with none today and only small amount yesterday. Does have excess flatus noted in colostomy bag. On heparin infusion for LV thrombus and levels have been therapeutic. Received first dose of coumadin 5mg this evening. Heparin was held and nursing instructed today notify cardiology. Stat HH. 14.2 and 44.7. CT abd/pelvis with contrast ordered. GI consulted.      Impression:  1. There are bilateral small pleural effusions with underlying passive basal atelectasis.  2. There is mucosal hyperenhancement of the proxial large bowel loops raising some concern for colitis.  3. There is a left inguinal hernia defect with terminal ileal bowel loops and mesentry as contents.  4. Non obstructive bowel pattern.  5. Details and other findings as discussed above.

## 2023-11-02 NOTE — CONSULTS
Acute Care Surgery   History and Physical    Patient Name: Ye Vergara  YOB: 1960  Date: 11/02/2023 4:44 PM  Date of Admission: 10/30/2023  HD#3  POD#* No surgery found *    PRESENTING HISTORY   Chief Complaint/Reason for Admission: CAP (community acquired pneumonia)    History of Present Illness:  Ye Vergara is a 63M w/ PMH CHF (EF<20) s/p ICD, CAD s/p PCI (2022), HTN, COPD, lung cancer s/p XRT, colon cancer s/p colectomy (2018?) c/b adhesions causing perforation resulting in diverting R sided colostomy with plans of revision - however no revision has occurred - pt still has L sided colon connected to rectum per imaging. Pt admitted for CAP. During workup, echo revealed LV thrombus. Cardiology started heparin gtt with coumadin.  Pt had episode of rectal bleeding overnight 11/1 and during the day 11/2. Heparin and coumadin stopped. GI consulted, planning for flex sig 11/3 with fleet enema prep. CTAP demonstrated blind ending colon loop at level of mid transverse colon, localized severe edematous wall thickening of proximal sigmoid colon LLQ w/ mild regional stranding - possible colitis, possible infiltrative mass as well as enhancement of blind ending descending duodenum in L abdomen - possible colitis as well. Hgb stable 15.3 from 14.2. Pt states this has never happened before. Of note, has a large L sided inguinal hernia, reducible. Pt endorses pain where L sided hernia is - pain improves with reduction of hernia.     Review of Systems:  12 point ROS negative except as stated in HPI    PAST HISTORY:   Past medical history:  History reviewed. No pertinent past medical history.    Past surgical history:  History reviewed. No pertinent surgical history.    Family history:  History reviewed. No pertinent family history.    Social history:  Social History     Socioeconomic History    Marital status:      Social Determinants of Health     Financial Resource Strain: High Risk (11/1/2023)     Overall Financial Resource Strain (CARDIA)     Difficulty of Paying Living Expenses: Very hard   Food Insecurity: Food Insecurity Present (11/1/2023)    Hunger Vital Sign     Worried About Running Out of Food in the Last Year: Often true     Ran Out of Food in the Last Year: Often true   Transportation Needs: No Transportation Needs (11/1/2023)    PRAPARE - Transportation     Lack of Transportation (Medical): No     Lack of Transportation (Non-Medical): No   Physical Activity: Inactive (11/1/2023)    Exercise Vital Sign     Days of Exercise per Week: 0 days     Minutes of Exercise per Session: 0 min   Stress: Stress Concern Present (11/1/2023)    [employer] - Occupational Stress Questionnaire     Feeling of Stress : Very much   Social Connections: Socially Isolated (11/1/2023)    Social Connection and Isolation Panel [NHANES]     Frequency of Communication with Friends and Family: Twice a week     Frequency of Social Gatherings with Friends and Family: Once a week     Attends Spiritism Services: Never     Active Member of Clubs or Organizations: No     Attends Club or Organization Meetings: Never     Marital Status:    Housing Stability: Low Risk  (11/1/2023)    Housing Stability Vital Sign     Unable to Pay for Housing in the Last Year: No     Number of Places Lived in the Last Year: 1     Unstable Housing in the Last Year: No     Social History     Tobacco Use   Smoking Status Not on file   Smokeless Tobacco Not on file      Social History     Substance and Sexual Activity   Alcohol Use None        MEDICATIONS & ALLERGIES:     No current facility-administered medications on file prior to encounter.     No current outpatient medications on file prior to encounter.       Allergies: Review of patient's allergies indicates:  No Known Allergies    Scheduled Meds:   azithromycin  500 mg Intravenous Q24H    magnesium sulfate IVPB  2 g Intravenous Once    piperacillin-tazobactam  "(Zosyn) IV (PEDS and ADULTS) (extended infusion is not appropriate)  4.5 g Intravenous Q8H       Continuous Infusions:    PRN Meds:acetaminophen, acetaminophen, albuterol-ipratropium, HYDROcodone-acetaminophen, melatonin, sodium chloride 0.9%, sodium chloride 0.9%    OBJECTIVE:   Vital Signs:  VITAL SIGNS: 24 HR MIN & MAX LAST   Temp  Min: 97.3 °F (36.3 °C)  Max: 97.6 °F (36.4 °C)  97.3 °F (36.3 °C)   BP  Min: 93/64  Max: 116/84  93/64    Pulse  Min: 69  Max: 95  86    Resp  Min: 16  Max: 20  18    SpO2  Min: 90 %  Max: 100 %  (!) 90 %      HT: 5' 8.9" (175 cm)  WT: 73.4 kg (161 lb 13.1 oz)  BMI: 24     Intake/output:  Intake/Output - Last 3 Shifts         10/31 0700  11/01 0659 11/01 0700 11/02 0659 11/02 0700 11/03 0659    P.O. 480  600    Total Intake(mL/kg) 480 (6.5)  600 (8.2)    Urine (mL/kg/hr) 3925 (2.2) 3660 (2.1) 320 (0.4)    Emesis/NG output   0    Stool 0 0 0    Total Output 3925 3660 320    Net -3445 -3660 +280           Urine Occurrence 3 x 2 x 0 x    Stool Occurrence 0 x 0 x 3 x    Emesis Occurrence   0 x            Intake/Output Summary (Last 24 hours) at 11/2/2023 1644  Last data filed at 11/2/2023 1300  Gross per 24 hour   Intake 600 ml   Output 1070 ml   Net -470 ml         Physical Exam:  General: Well developed, well nourished, no acute distress  HEENT: Normocephalic, atraumatic, PERRL  CV: RR  Resp: NWOB  GI:  Abdomen soft, non-tender, non-distended, no guarding, no rebound, normoactive bowel sounds. R sided colostomy, L inguinal hernia.   :  Deferred  MSK: No muscle atrophy, cyanosis, peripheral edema, moving all extremities spontaneously  Skin/wounds:  No rashes, ulcers, erythema  Neuro:  CNII-XII grossly intact, alert and oriented to person, place, and time    Labs:  Troponin:  Recent Labs     10/30/23  2043 10/31/23  0244   TROPONINI 0.047* 0.046*     CBC:  Recent Labs     10/31/23  0244 10/31/23  1539 11/01/23  0419 11/01/23 2008 11/02/23 0411   WBC 16.38* 16.79* 22.08*  --  25.39* " "  RBC 5.19 5.29 5.25  --  5.19   HGB 15.4 15.6 15.4   < > 15.3   HCT 48.0 49.4 48.5   < > 47.7    136 126*  --  133   MCV 92.5 93.4 92.4  --  91.9   MCH 29.7 29.5 29.3  --  29.5   MCHC 32.1* 31.6* 31.8*  --  32.1*    < > = values in this interval not displayed.     CMP:  Recent Labs     10/31/23  0244 11/01/23  0419 11/02/23  0411   CALCIUM 8.6*   < > 7.8*   ALBUMIN 2.8*  --  2.7*      < > 134*   K 4.3   < > 3.3*   CO2 28   < > 33*   BUN 33.1*   < > 21.0   CREATININE 1.10   < > 0.89   ALKPHOS 114  --  144   ALT 99*  --  55   AST 62*  --  37*   BILITOT 1.3  --  1.6*    < > = values in this interval not displayed.     Lactic Acid:  No results for input(s): "LACTATE" in the last 72 hours.  ETOH:  No results for input(s): "ETHANOL" in the last 72 hours.   Urine Drug Screen:  No results for input(s): "COCAINE", "OPIATE", "BARBITURATE", "AMPHETAMINE", "FENTANYL", "CANNABINOIDS", "MDMA" in the last 72 hours.    Invalid input(s): "BENZODIAZEPINE", "PHENCYCLIDINE"   ABG:  No results for input(s): "PH", "PO2", "PCO2", "HCO3", "BE" in the last 168 hours.     Diagnostic Results:  CT Abdomen Pelvis With IV Contrast   Final Result      Surgical changes consistent with partial colon resection.  There appears to be a a blind-ending colon loop at the level of the mid transverse colon in the upper abdomen.  There is a localized region of suspected severe edematous wall thickening of the proximal sigmoid colon left lower quadrant with mild regional stranding which could indicate colitis or possible infiltrative mass.  Enhancement of the blind-ending descending duodenum in the left abdomen could indicate colitis as well.      Surgical changes in the pelvis and likely treatment related thickening along the presacral space.      Left inguinal hernia containing bowel loops without obstruction.      There is a 6 mm hypodensity in the liver, nonspecific.  Malignancy not excluded.      Other incidental findings as above.    "   Agree with macario.         Electronically signed by: Kaylynn Painting MD   Date:    11/02/2023   Time:    09:11      X-Ray Chest PA And Lateral   Final Result        ASSESSMENT & PLAN:    Ye Vergara is a 63M w/ PMH CHF (EF<20) s/p ICD, CAD s/p PCI (2022), HTN, COPD, lung cancer s/p XRT, colon cancer s/p colectomy (2018?) c/b adhesions causing perforation resulting in diverting R sided colostomy with plans of revision - however no revision has occurred - pt still has L sided colon connected to rectum per imaging. Pt having rectal bleeding overnight 11/1 and during the day 11/2 while on heparin and coumadin.     - H&H stable at this time, monitor with BID H&H  - follow up flex sig 11/3 with GI  - will continue to follow patient, not a good surgical candidate at this time, pt would benefit from medical management/minimally invasive management - however, if patient continues to actively bleed and requires multiple blood transfusions will discuss surgical options including having discussion of risks and benefits.     Rosalva Keys  [employer] General Surgery PGY1

## 2023-11-02 NOTE — PROGRESS NOTES
[facility] Hospital Medicine Progress Note        Chief Complaint: Inpatient Follow-up pneumonia    HPI:   63-year-old male with a history of CHF with EF less than 20% status post ICD, CAD status post PCI 2022, essential hypertension, COPD, lung cancer status post XRT, colon cancer status post colectomy who was recently admitted at [facility] for sepsis from community-acquired pneumonia but reported left 2 days ago against medical advice.  He presents to the ER tonight complaining of persistent dyspnea, cough with purulent green mucus production.  He also complains of left groin pain, has a chronic left groin hernia, but states it easily reducible and he has been told he is not a surgical candidate due to his comorbidities.     He arrived afebrile but tachycardic and mildly tachypneic, hemodynamically stable and requiring 4 L nasal cannula to maintain adequate sats.  Laboratory work showed leukocytosis of 15 K, mild transaminitis, a BNP of 3500 and a mildly elevated troponin of 0.085.  Influenza and COVID testing was negative.  Chest x-ray showed ill-defined bilateral mid/lower zone hazy infiltrates concerning for developing atypical process.  Echocardiogram revealed LV thrombus.  Cardiology started on heparin drip with Coumadin.  Will need lifelong Coumadin and follow up.      Interval Hx:  Patient had episode of rectal bleeding overnight, heparin and Coumadin was stopped.  GI is consulted and planning for a scope.  If he still is on the low side.  Patient does not have lethargy.  He is complaining of left inguinal hernia pain.  Status chronic however she is feeling the pain more recently.     Objective/physical exam:  General: In no acute distress, afebrile  Chest: Clear to auscultation bilaterally  Heart: RRR, +S1, S2, no appreciable murmur  Abdomen: Soft, nontender, BS +  MSK: Warm, no lower extremity edema, no clubbing or cyanosis  Neurologic: Alert and oriented x4,  Cranial nerve II-XII intact, Strength 5/5 in all 4 extremities     VITAL SIGNS: 24 HRS MIN & MAX LAST   Temp  Min: 97.3 °F (36.3 °C)  Max: 97.6 °F (36.4 °C) 97.3 °F (36.3 °C)   BP  Min: 95/59  Max: 124/82 (!) 95/59   Pulse  Min: 69  Max: 98  69   Resp  Min: 17  Max: 20 18   SpO2  Min: 90 %  Max: 100 % 98 %       Recent Labs   Lab 10/31/23  1539 11/01/23 0419 11/01/23 2008 11/02/23 0411   WBC 16.79* 22.08*  --  25.39*   RBC 5.29 5.25  --  5.19   HGB 15.6 15.4 14.2 15.3   HCT 49.4 48.5 44.7 47.7   MCV 93.4 92.4  --  91.9   MCH 29.5 29.3  --  29.5   MCHC 31.6* 31.8*  --  32.1*   RDW 15.8 15.4  --  14.9    126*  --  133   MPV 11.8* 12.3*  --  12.6*         Recent Labs   Lab 10/30/23  1514 10/31/23  0244 11/01/23 0419 11/02/23 0411    144 139 134*   K 4.0 4.3 3.4* 3.3*   CO2 24 28 27 33*   BUN 38.6* 33.1* 26.7* 21.0   CREATININE 1.05 1.10 0.87 0.89   CALCIUM 8.4* 8.6* 7.9* 7.8*   MG  --   --  1.30* 1.50*   ALBUMIN 3.1* 2.8*  --  2.7*   ALKPHOS 136 114  --  144   * 99*  --  55   AST 82* 62*  --  37*   BILITOT 1.4 1.3  --  1.6*            Microbiology Results (last 7 days)       Procedure Component Value Units Date/Time    Respiratory Culture [[id number]]  (Abnormal) Collected: 10/31/23 0626    Order Status: Completed Specimen: Sputum, Expectorated Updated: 11/02/23 0754     Respiratory Culture Moderate Yeast     Comment: with normal respiratory lashell        GRAM STAIN Quality 2+      Few Gram positive cocci      Few Gram Positive Rods      Few Yeast    Blood Culture [[id number]]  (Normal) Collected: 10/30/23 1514    Order Status: Completed Specimen: Blood Updated: 11/01/23 1601     CULTURE, BLOOD ([facility]) No Growth At 48 Hours    Blood Culture [[id number]]  (Normal) Collected: 10/30/23 1514    Order Status: Completed Specimen: Blood Updated: 11/01/23 1601     CULTURE, BLOOD ([facility]) No Growth At 48 Hours             Radiology:  CT Abdomen Pelvis With IV Contrast  Narrative: EXAMINATION:  CT ABDOMEN  PELVIS WITH IV CONTRAST    CLINICAL HISTORY:  Bowel obstruction suspected;    TECHNIQUE:  Low dose axial images, sagittal and coronal reformations were obtained from the lung bases to the pubic symphysis following the IV administration of 100 mL of Omnipaque 350 .  Oral contrast given.  .  Automated exposure control used.    COMPARISON:  None.    FINDINGS:  Liver demonstrates normal enhancement.  There is a nonspecific appearing hypodensity in the right hepatic lobe measuring 6 mm on image 37 series 2.    Gallbladder contracted.  Possible cholelithiasis.  Biliary ductal system normal.  Pancreas normal.  Stomach, spleen, adrenal glands normal.    Kidneys mild renal core cortical thinning, prominent renal cortical scarring bilaterally.  No hydronephrosis.    Aorta tapers normally.  Mild moderate atherosclerotic calcification.    Surgical changes consistent with partial colon resection.  There is a ascending colon colostomy in the right upper quadrant.  There appears to be a blind-ending loop of colon including the descending, sigmoid colon with the blind in near the mid transverse colon region.  There is a region suspected severe thickening of the mid to proximal sigmoid colon wall and enhancement of the descending colon proximally.  Moderate stranding noted in the left lower quadrant adjacent to the colon.  Surgical clips noted in the pelvis bilaterally.  Thickening in the presacral space abutting the rectum wall posteriorly possible treatment related changes.  Small bowel loops normal.  No significant ascites.  No significant lymphadenopathy in the abdomen or pelvis.    Left inguinal hernia containing multiple bowel loops extending nearly to the scrotum.  No definite evidence of incarceration or obstruction.    Bladder and rectum otherwise unremarkable.    Bones intact.    Severe centrilobular emphysematous changes the lung bases.  Mild moderate pleural effusion on the right, dependent atelectasis  bilaterally.  Localized segmental area of infiltrate dependent right lower lobe.  Impression: Surgical changes consistent with partial colon resection.  There appears to be a a blind-ending colon loop at the level of the mid transverse colon in the upper abdomen.  There is a localized region of suspected severe edematous wall thickening of the proximal sigmoid colon left lower quadrant with mild regional stranding which could indicate colitis or possible infiltrative mass.  Enhancement of the blind-ending descending duodenum in the left abdomen could indicate colitis as well.    Surgical changes in the pelvis and likely treatment related thickening along the presacral space.    Left inguinal hernia containing bowel loops without obstruction.    There is a 6 mm hypodensity in the liver, nonspecific.  Malignancy not excluded.    Other incidental findings as above.    Agree with nighthawk.    Electronically signed by: Kaylynn Painting MD  Date:    11/02/2023  Time:    09:11      Scheduled Med:   azithromycin  500 mg Intravenous Q24H    carvediloL  3.125 mg Oral BID    [START ON 11/3/2023] furosemide (LASIX) injection  40 mg Intravenous Daily    magnesium sulfate IVPB  2 g Intravenous Once    piperacillin-tazobactam (Zosyn) IV (PEDS and ADULTS) (extended infusion is not appropriate)  4.5 g Intravenous Q8H          PRN Meds:  acetaminophen, acetaminophen, albuterol-ipratropium, HYDROcodone-acetaminophen, melatonin, sodium chloride 0.9%, sodium chloride 0.9%       Assessment/Plan:   Multifactorial acute hypoxemic respiratory failure  Community-acquired pneumonia contributing to above  Decompensated systolic congestive CHF contributing to above  LV thrombus  NSTEMI/type 2 demand ischemia 2/2 above  Left groin inguinal hernia  CHF, documented LVEF less than 20%, 10/2022 s/p ICD   CAD s/p PCI 10/2022  Essential hypertension   COPD, not on home oxygen   Lung cancer  Colon cancer, status post colectomy    -Rectal bleeding in a  patient who has colostomy,   -CT shows colitis, concern for ischemic colitis giving his history.  -Blood pressure is on the low side, we will discontinue Coreg and Lasix at this time.    -start IV fluids at this time,  hold Lasix and see if it helps.  However we will monitor very closely.    -appreciate GI recommendations  -we will consult General surgery for their evaluation, in full left inguinal hernia and also concern for ischemic colitis.  - possibility of liver nodule, patient will need imaging studies to confirm if this is a metastatic lesion giving his history.  - will get lactic acid.  - Zosyn and azithromycin considering the pneumonia and also colitis.    Germán Benoit M.D.  [facility]/Hospital Medicine Department  [facility]      All diagnosis and differential diagnosis have been reviewed; assessment and plan has been documented; I have personally reviewed the labs and test results that are presently available; I have reviewed the patients medication list; I have reviewed the consulting providers response and recommendations. I have reviewed or attempted to review medical records based upon their availability    All of the patient's questions have been  addressed and answered. Patient's is agreeable to the above stated plan. I will continue to monitor closely and make adjustments to medical management as needed.  _____________________________________________________________________

## 2023-11-02 NOTE — CONSULTS
Gastroenterology Consultation Note    Reason for Consult:  The primary encounter diagnosis was Pneumonia of both lungs due to infectious organism, unspecified part of lung. Diagnoses of Shortness of breath, Congestive heart failure, unspecified HF chronicity, unspecified heart failure type, Hypoxia, Left inguinal hernia, SOB (shortness of breath), and LV (left ventricular) mural thrombus were also pertinent to this visit.    PCP:   Aaron Weeks MD    Referring MD:  Self, Aaareferral  No address on file    Hospital Day: 3     Initial History of Present Illness (HPI):  This is a 63 y.o. male with a history of CHF with EF less than 20% status post ICD, CAD status post PCI 2022, essential hypertension, COPD, lung cancer status post XRT, colon cancer status post colectomy who was recently admitted at [facility] for sepsis from community-acquired pneumonia but reported left 2 days ago against medical advice.  He presents to the ER tonight complaining of persistent dyspnea, cough with purulent green mucus production.  He also complains of left groin pain, has a chronic left groin hernia, but states it easily reducible and he has been told he is not a surgical candidate due to his comorbidities.Echocardiogram revealed LV thrombus.  Cardiology started on heparin drip with Coumadin.  Will need lifelong Coumadin.    GI was consulted because patient started having BRBPR yesterday. There was description of a moderate amount of dark/bright red stool through his rectum.  He did have a second episode of this today.  Hgb has remained stable 14.2 -->15.3 today. His heparin drip was stopped. He did receive 1 dose of coumadin yesterday (5mg). INR 1.3 today.     As for patient's GI anatomy, there are no records to review and patient does not currently follow with a GI physician. All information was from patient report.    His last Colonoscopy through colostomy and rectum was about 2 years ago with MD Cobian.   He has  "a history of colon cancer with small, uncomplicated colon resection "years ago". In 2018? He had a second emergent colon surgery due to colon tissue adhesions causing some kind of urgent perforation that resulted in a diverting right sided colostomy with plans for reconnecting- however, patient was unable to be reconnected. Based on imaging report, patient seems to have a significant amount of left sided colon that is connected to the rectum. Patient denies any stooling from the rectum but about once a month he will have a small amount of mucoid discharge. He has never had BRBPR or bleeding through his ostomy.   He does seem to have a history of ileus or intestinal obstruction based on his description.     His Right sided colostomy today has a large amount of dark brown liquid stooling. He reports having some solid stools but mostly soft/semi-form or liquid stool output through ostomy.    ROS:  Review of Systems   Constitutional:  Negative for chills and fever.   HENT:  Negative for hearing loss.    Eyes:  Negative for blurred vision.   Respiratory:  Positive for shortness of breath and wheezing. Negative for cough and hemoptysis.    Cardiovascular:  Negative for chest pain and palpitations.   Gastrointestinal:  Negative for abdominal pain, heartburn, nausea and vomiting.        Right upper quadrant colostomy; no overt bleeding in ostomy bad; There is BRBPR   Genitourinary:  Negative for dysuria.   Skin:  Negative for rash.   Neurological:  Positive for weakness. Negative for dizziness and headaches.   Psychiatric/Behavioral:  Negative for depression and memory loss. The patient is not nervous/anxious.        Medical History:   History reviewed. No pertinent past medical history.    Surgical History:   History reviewed. No pertinent surgical history.    Family History:   History reviewed. No pertinent family history..     Social History:   Social History     Tobacco Use    Smoking status: Not on file    Smokeless " "tobacco: Not on file   Substance Use Topics    Alcohol use: Not on file       Allergies:  Review of patient's allergies indicates:  No Known Allergies    No medications prior to admission.         Objective Findings:    Vital Signs:  /72 (BP Location: Right arm, Patient Position: Sitting)   Pulse 92   Temp 97.6 °F (36.4 °C) (Oral)   Resp 18   Ht 5' 8.9" (1.75 m)   Wt 73.4 kg (161 lb 13.1 oz)   SpO2 96%   BMI 23.97 kg/m²   Body mass index is 23.97 kg/m².    Physical Exam:  Physical Exam  Constitutional:       General: He is not in acute distress.  HENT:      Head: Normocephalic.      Mouth/Throat:      Mouth: Mucous membranes are moist.      Pharynx: Oropharynx is clear.   Eyes:      Extraocular Movements: Extraocular movements intact.      Pupils: Pupils are equal, round, and reactive to light.   Cardiovascular:      Rate and Rhythm: Normal rate and regular rhythm.      Pulses: Normal pulses.      Heart sounds: Normal heart sounds. No murmur heard.  Pulmonary:      Effort: Pulmonary effort is normal. No respiratory distress.      Breath sounds: Wheezing and rhonchi present.      Comments: O2 2 L via NC  Abdominal:      General: Bowel sounds are normal. There is no distension.      Palpations: Abdomen is soft.      Tenderness: There is no abdominal tenderness. There is no guarding.      Hernia: A hernia is present.          Comments: Right Upper Quadrant Colostomy; large protruding left inguinal hernia   Musculoskeletal:         General: No swelling.   Skin:     General: Skin is warm and dry.      Coloration: Skin is not jaundiced.   Neurological:      Mental Status: He is alert and oriented to person, place, and time.      Motor: Weakness present.   Psychiatric:         Mood and Affect: Mood normal.         Behavior: Behavior normal.         Labs:  Recent Results (from the past 48 hour(s))   Echo    Collection Time: 10/31/23 12:41 PM   Result Value Ref Range    BSA 1.82 m2    Guerra's Biplane MOD " Ejection Fraction 17 %    LVOT stroke volume 27.49 cm3    LVIDd 6.44 (A) 3.5 - 6.0 cm    LV Systolic Volume 152.00 mL    LV Systolic Volume Index 83.1 mL/m2    LVIDs 5.57 (A) 2.1 - 4.0 cm    LV Diastolic Volume 211.00 mL    LV Diastolic Volume Index 115.30 mL/m2    IVS 0.81 0.6 - 1.1 cm    LVOT diameter 1.90 cm    LVOT area 2.8 cm2    FS 14 (A) 28 - 44 %    Left Ventricle Relative Wall Thickness 0.28 cm    Posterior Wall 0.89 0.6 - 1.1 cm    LV mass 227.12 g    LV Mass Index 124 g/m2    MV Peak E Mikie 0.68 m/s    TDI LATERAL 0.06 m/s    TDI SEPTAL 0.04 m/s    E/E' ratio 13.60 m/s    MV Peak A Mikie 0.66 m/s    TR Max Mikie 3.25 m/s    E/A ratio 1.03     E wave deceleration time 119.00 msec    LV SEPTAL E/E' RATIO 17.00 m/s    LV LATERAL E/E' RATIO 11.33 m/s    LVOT peak mikie 0.70 m/s    Left Ventricular Outflow Tract Mean Velocity 0.44 cm/s    Left Ventricular Outflow Tract Mean Gradient 1.00 mmHg    RVDD 3.35 cm    RV mid diameter 4.25 cm    TAPSE 1.74 cm    LA size 4.00 cm    LA volume (mod) 82.80 cm3    LA Volume Index (Mod) 45.2 mL/m2    RA Major Axis 4.76 cm    RA Width 4.54 cm    AV mean gradient 2 mmHg    AV peak gradient 4 mmHg    Ao peak mikie 0.97 m/s    Ao VTI 14.60 cm    LVOT peak VTI 9.70 cm    AV valve area 1.88 cm²    AV Velocity Ratio 0.72     AV index (prosthetic) 0.66     HOWARD by Velocity Ratio 2.05 cm²    MV mean gradient 2 mmHg    MV peak gradient 3 mmHg    MV valve area by continuity eq 2.48 cm2    MV VTI 11.1 cm    Triscuspid Valve Regurgitation Peak Gradient 42 mmHg    Mean e' 0.05 m/s    ZLVIDS 4.55     ZLVIDD 2.39    APTT    Collection Time: 10/31/23  3:39 PM   Result Value Ref Range    PTT 29.7 23.2 - 33.7 seconds   Protime-INR    Collection Time: 10/31/23  3:39 PM   Result Value Ref Range    PT 15.5 (H) 12.5 - 14.5 seconds    INR 1.2 <=1.3   CBC with Differential    Collection Time: 10/31/23  3:39 PM   Result Value Ref Range    WBC 16.79 (H) 4.50 - 11.50 x10(3)/mcL    RBC 5.29 4.70 - 6.10  x10(6)/mcL    Hgb 15.6 14.0 - 18.0 g/dL    Hct 49.4 42.0 - 52.0 %    MCV 93.4 80.0 - 94.0 fL    MCH 29.5 27.0 - 31.0 pg    MCHC 31.6 (L) 33.0 - 36.0 g/dL    RDW 15.8 11.5 - 17.0 %    Platelet 136 130 - 400 x10(3)/mcL    MPV 11.8 (H) 7.4 - 10.4 fL    Neut % 85.1 %    Lymph % 6.1 %    Mono % 6.4 %    Eos % 1.0 %    Basophil % 0.3 %    Lymph # 1.03 0.6 - 4.6 x10(3)/mcL    Neut # 14.30 (H) 2.1 - 9.2 x10(3)/mcL    Mono # 1.07 0.1 - 1.3 x10(3)/mcL    Eos # 0.16 0 - 0.9 x10(3)/mcL    Baso # 0.05 <=0.2 x10(3)/mcL    IG# 0.18 (H) 0 - 0.04 x10(3)/mcL    IG% 1.1 %    NRBC% 0.0 %   PTT Heparin Monitoring    Collection Time: 10/31/23 11:42 PM   Result Value Ref Range    PTT Heparin Monitor 85.5 (H) 23.2 - 33.7 seconds   Basic Metabolic Panel    Collection Time: 11/01/23  4:19 AM   Result Value Ref Range    Sodium Level 139 136 - 145 mmol/L    Potassium Level 3.4 (L) 3.5 - 5.1 mmol/L    Chloride 100 98 - 107 mmol/L    Carbon Dioxide 27 23 - 31 mmol/L    Glucose Level 99 82 - 115 mg/dL    Blood Urea Nitrogen 26.7 (H) 8.4 - 25.7 mg/dL    Creatinine 0.87 0.73 - 1.18 mg/dL    BUN/Creatinine Ratio 31     Calcium Level Total 7.9 (L) 8.8 - 10.0 mg/dL    Anion Gap 12.0 mEq/L    eGFR >60 mls/min/1.73/m2   Magnesium    Collection Time: 11/01/23  4:19 AM   Result Value Ref Range    Magnesium Level 1.30 (L) 1.60 - 2.60 mg/dL   CBC with Differential    Collection Time: 11/01/23  4:19 AM   Result Value Ref Range    WBC 22.08 (H) 4.50 - 11.50 x10(3)/mcL    RBC 5.25 4.70 - 6.10 x10(6)/mcL    Hgb 15.4 14.0 - 18.0 g/dL    Hct 48.5 42.0 - 52.0 %    MCV 92.4 80.0 - 94.0 fL    MCH 29.3 27.0 - 31.0 pg    MCHC 31.8 (L) 33.0 - 36.0 g/dL    RDW 15.4 11.5 - 17.0 %    Platelet 126 (L) 130 - 400 x10(3)/mcL    MPV 12.3 (H) 7.4 - 10.4 fL    Neut % 82.6 %    Lymph % 6.4 %    Mono % 8.0 %    Eos % 1.2 %    Basophil % 0.4 %    Lymph # 1.42 0.6 - 4.6 x10(3)/mcL    Neut # 18.23 (H) 2.1 - 9.2 x10(3)/mcL    Mono # 1.77 (H) 0.1 - 1.3 x10(3)/mcL    Eos # 0.26 0 - 0.9  x10(3)/mcL    Baso # 0.08 <=0.2 x10(3)/mcL    IG# 0.32 (H) 0 - 0.04 x10(3)/mcL    IG% 1.4 %    NRBC% 0.0 %    IPF 15.3 (H) 0.9 - 11.2 %   APTT    Collection Time: 11/01/23  8:03 AM   Result Value Ref Range    PTT 64.7 (H) 23.2 - 33.7 seconds   PTT Heparin Monitoring    Collection Time: 11/01/23  8:03 AM   Result Value Ref Range    PTT Heparin Monitor 63.7 (H) 23.2 - 33.7 seconds   PTT Heparin Monitoring    Collection Time: 11/01/23  5:01 PM   Result Value Ref Range    PTT Heparin Monitor 81.3 (H) 23.2 - 33.7 seconds   Hemoglobin and Hematocrit    Collection Time: 11/01/23  8:08 PM   Result Value Ref Range    Hgb 14.2 14.0 - 18.0 g/dL    Hct 44.7 42.0 - 52.0 %   Comprehensive Metabolic Panel    Collection Time: 11/02/23  4:11 AM   Result Value Ref Range    Sodium Level 134 (L) 136 - 145 mmol/L    Potassium Level 3.3 (L) 3.5 - 5.1 mmol/L    Chloride 88 (L) 98 - 107 mmol/L    Carbon Dioxide 33 (H) 23 - 31 mmol/L    Glucose Level 91 82 - 115 mg/dL    Blood Urea Nitrogen 21.0 8.4 - 25.7 mg/dL    Creatinine 0.89 0.73 - 1.18 mg/dL    Calcium Level Total 7.8 (L) 8.8 - 10.0 mg/dL    Protein Total 5.3 (L) 5.8 - 7.6 gm/dL    Albumin Level 2.7 (L) 3.4 - 4.8 g/dL    Globulin 2.6 2.4 - 3.5 gm/dL    Albumin/Globulin Ratio 1.0 (L) 1.1 - 2.0 ratio    Bilirubin Total 1.6 (H) <=1.5 mg/dL    Alkaline Phosphatase 144 40 - 150 unit/L    Alanine Aminotransferase 55 0 - 55 unit/L    Aspartate Aminotransferase 37 (H) 5 - 34 unit/L    eGFR >60 mls/min/1.73/m2   Magnesium    Collection Time: 11/02/23  4:11 AM   Result Value Ref Range    Magnesium Level 1.50 (L) 1.60 - 2.60 mg/dL   Protime-INR    Collection Time: 11/02/23  4:11 AM   Result Value Ref Range    PT 15.7 (H) 12.5 - 14.5 seconds    INR 1.3 <=1.3   CBC with Differential    Collection Time: 11/02/23  4:11 AM   Result Value Ref Range    WBC 25.39 (H) 4.50 - 11.50 x10(3)/mcL    RBC 5.19 4.70 - 6.10 x10(6)/mcL    Hgb 15.3 14.0 - 18.0 g/dL    Hct 47.7 42.0 - 52.0 %    MCV 91.9 80.0 - 94.0  fL    MCH 29.5 27.0 - 31.0 pg    MCHC 32.1 (L) 33.0 - 36.0 g/dL    RDW 14.9 11.5 - 17.0 %    Platelet 133 130 - 400 x10(3)/mcL    MPV 12.6 (H) 7.4 - 10.4 fL    Neut % 89.6 %    Lymph % 3.9 %    Mono % 4.4 %    Eos % 0.5 %    Basophil % 0.4 %    Lymph # 0.99 0.6 - 4.6 x10(3)/mcL    Neut # 22.75 (H) 2.1 - 9.2 x10(3)/mcL    Mono # 1.11 0.1 - 1.3 x10(3)/mcL    Eos # 0.12 0 - 0.9 x10(3)/mcL    Baso # 0.11 <=0.2 x10(3)/mcL    IG# 0.31 (H) 0 - 0.04 x10(3)/mcL    IG% 1.2 %    NRBC% 0.0 %   Type & Screen    Collection Time: 11/02/23  4:11 AM   Result Value Ref Range    Group & Rh O POS     Indirect Delmy GEL NEG     Specimen Outdate 11/05/2023 23:59    ABORH RETYPE    Collection Time: 11/02/23  7:36 AM   Result Value Ref Range    ABORH Retype O POS        CT Abdomen Pelvis With IV Contrast   Final Result      Surgical changes consistent with partial colon resection.  There appears to be a a blind-ending colon loop at the level of the mid transverse colon in the upper abdomen.  There is a localized region of suspected severe edematous wall thickening of the proximal sigmoid colon left lower quadrant with mild regional stranding which could indicate colitis or possible infiltrative mass.  Enhancement of the blind-ending descending duodenum in the left abdomen could indicate colitis as well.      Surgical changes in the pelvis and likely treatment related thickening along the presacral space.      Left inguinal hernia containing bowel loops without obstruction.      There is a 6 mm hypodensity in the liver, nonspecific.  Malignancy not excluded.      Other incidental findings as above.      Agree with macario.         Electronically signed by: Kaylynn Painting MD   Date:    11/02/2023   Time:    09:11      X-Ray Chest PA And Lateral   Final Result          Imaging:  Imaging Results              X-Ray Chest PA And Lateral (Final result)  Result time 10/30/23 16:52:57      Final result by Azeem Stanley MD (10/30/23  16:52:57)                   Narrative:    EXAMINATION  XR CHEST PA AND LATERAL    CLINICAL HISTORY  Shortness of breath;    TECHNIQUE  A total of 2 images submitted of the chest.    COMPARISON  None available at the time of initial interpretation.    FINDINGS  Lines/tubes/devices: Right chest wall subcutaneous port is present, catheter terminating at the upper to mid SVC region.  Single lead left chest wall pacemaker/ICD also noted.  Multiple ECG leads overlie the chest.    The cardiomediastinal silhouette and central pulmonary vasculature are unremarkable contour and size.  The trachea is midline.  Ill-defined hazy opacification of the bilateral mid and lower lung zones is appreciated, with no organized lobar consolidation identified.  Small right pleural effusion is suspected.  There is no convincing pneumothorax.    There is no acute osseous or extrathoracic abnormality.    IMPRESSION  1. Ill-defined bilateral mid/lower zone hazy infiltrates, developing atypical infectious process not excluded.  2. Suspected small right pleural effusion.      Electronically signed by: Azeem Stanley  Date:    10/30/2023  Time:    16:52                                   Endoscopy:    Last 2 years ago with MD Cobian through ostomy and rectum. Patient does not think he has had an EGD before    Assessment/Plan:  BRBPR- moderate amount x2 episodes at least. Continues bleeding now.   Heparin gtt on hold- recommend continue holding until source of bleeding identified  Flexible Sigmoidoscopy tomorrow (pending discussion with Dr. Rapp) with fleet enema prep. NPO after midnight. Okay to stay on normal diet today  2. Liver Lesion noted on CT- if further investigation needed, would recommend a CT triple phase for liver lesion or MRI Abdomen after acute issues addressed.    A. AST very mildly elevated; T bili 1.6; will order AFP      Thank you for allowing us to participate in the care of Ye Vergara.    Natalya Morrison NP acting as scribe  for Dr. Gamal Rapp MD

## 2023-11-03 ENCOUNTER — ANESTHESIA EVENT (OUTPATIENT)
Dept: SURGERY | Facility: HOSPITAL | Age: 63
DRG: 280 | End: 2023-11-03
Payer: MEDICARE

## 2023-11-03 ENCOUNTER — ANESTHESIA (OUTPATIENT)
Dept: SURGERY | Facility: HOSPITAL | Age: 63
DRG: 280 | End: 2023-11-03
Payer: MEDICARE

## 2023-11-03 LAB
AFP-TM SERPL-MCNC: <2 NG/ML
ALBUMIN SERPL-MCNC: 2.8 G/DL (ref 3.4–4.8)
ALBUMIN/GLOB SERPL: 1 RATIO (ref 1.1–2)
ALP SERPL-CCNC: 133 UNIT/L (ref 40–150)
ALT SERPL-CCNC: 45 UNIT/L (ref 0–55)
AST SERPL-CCNC: 30 UNIT/L (ref 5–34)
BASOPHILS # BLD AUTO: 0.06 X10(3)/MCL
BASOPHILS NFR BLD AUTO: 0.3 %
BILIRUB SERPL-MCNC: 1.4 MG/DL
BUN SERPL-MCNC: 24.7 MG/DL (ref 8.4–25.7)
CALCIUM SERPL-MCNC: 8.4 MG/DL (ref 8.8–10)
CHLORIDE SERPL-SCNC: 88 MMOL/L (ref 98–107)
CO2 SERPL-SCNC: 41 MMOL/L (ref 23–31)
CREAT SERPL-MCNC: 1.27 MG/DL (ref 0.73–1.18)
EOSINOPHIL # BLD AUTO: 0.17 X10(3)/MCL (ref 0–0.9)
EOSINOPHIL NFR BLD AUTO: 0.9 %
ERYTHROCYTE [DISTWIDTH] IN BLOOD BY AUTOMATED COUNT: 15 % (ref 11.5–17)
GFR SERPLBLD CREATININE-BSD FMLA CKD-EPI: >60 MLS/MIN/1.73/M2
GLOBULIN SER-MCNC: 2.9 GM/DL (ref 2.4–3.5)
GLUCOSE SERPL-MCNC: 79 MG/DL (ref 82–115)
HCT VFR BLD AUTO: 42.3 % (ref 42–52)
HCT VFR BLD AUTO: 46.3 % (ref 42–52)
HGB BLD-MCNC: 13.2 G/DL (ref 14–18)
HGB BLD-MCNC: 14.4 G/DL (ref 14–18)
IMM GRANULOCYTES # BLD AUTO: 0.17 X10(3)/MCL (ref 0–0.04)
IMM GRANULOCYTES NFR BLD AUTO: 0.9 %
INR PPP: 1.5
LYMPHOCYTES # BLD AUTO: 1.02 X10(3)/MCL (ref 0.6–4.6)
LYMPHOCYTES NFR BLD AUTO: 5.2 %
MCH RBC QN AUTO: 29.3 PG (ref 27–31)
MCHC RBC AUTO-ENTMCNC: 31.1 G/DL (ref 33–36)
MCV RBC AUTO: 94.3 FL (ref 80–94)
MONOCYTES # BLD AUTO: 1 X10(3)/MCL (ref 0.1–1.3)
MONOCYTES NFR BLD AUTO: 5.1 %
NEUTROPHILS # BLD AUTO: 17.03 X10(3)/MCL (ref 2.1–9.2)
NEUTROPHILS NFR BLD AUTO: 87.6 %
NRBC BLD AUTO-RTO: 0 %
PLATELET # BLD AUTO: 167 X10(3)/MCL (ref 130–400)
PMV BLD AUTO: 12.3 FL (ref 7.4–10.4)
POCT GLUCOSE: 85 MG/DL (ref 70–110)
POTASSIUM SERPL-SCNC: 3.6 MMOL/L (ref 3.5–5.1)
PROT SERPL-MCNC: 5.7 GM/DL (ref 5.8–7.6)
PROTHROMBIN TIME: 17.6 SECONDS (ref 12.5–14.5)
RBC # BLD AUTO: 4.91 X10(6)/MCL (ref 4.7–6.1)
SODIUM SERPL-SCNC: 139 MMOL/L (ref 136–145)
WBC # SPEC AUTO: 19.45 X10(3)/MCL (ref 4.5–11.5)

## 2023-11-03 PROCEDURE — 88305 TISSUE EXAM BY PATHOLOGIST: CPT | Performed by: INTERNAL MEDICINE

## 2023-11-03 PROCEDURE — 85014 HEMATOCRIT: CPT | Performed by: SURGERY

## 2023-11-03 PROCEDURE — 25000003 PHARM REV CODE 250

## 2023-11-03 PROCEDURE — 85025 COMPLETE CBC W/AUTO DIFF WBC: CPT | Performed by: NURSE PRACTITIONER

## 2023-11-03 PROCEDURE — 94761 N-INVAS EAR/PLS OXIMETRY MLT: CPT

## 2023-11-03 PROCEDURE — 45331 SIGMOIDOSCOPY AND BIOPSY: CPT | Performed by: INTERNAL MEDICINE

## 2023-11-03 PROCEDURE — 82105 ALPHA-FETOPROTEIN SERUM: CPT | Performed by: NURSE PRACTITIONER

## 2023-11-03 PROCEDURE — 85610 PROTHROMBIN TIME: CPT | Performed by: NURSE PRACTITIONER

## 2023-11-03 PROCEDURE — 25000003 PHARM REV CODE 250: Performed by: HOSPITALIST

## 2023-11-03 PROCEDURE — 37000009 HC ANESTHESIA EA ADD 15 MINS: Performed by: INTERNAL MEDICINE

## 2023-11-03 PROCEDURE — 80053 COMPREHEN METABOLIC PANEL: CPT | Performed by: NURSE PRACTITIONER

## 2023-11-03 PROCEDURE — D9220A PRA ANESTHESIA: Mod: ANES,,, | Performed by: ANESTHESIOLOGY

## 2023-11-03 PROCEDURE — D9220A PRA ANESTHESIA: ICD-10-PCS | Mod: ANES,,, | Performed by: ANESTHESIOLOGY

## 2023-11-03 PROCEDURE — D9220A PRA ANESTHESIA: Mod: CRNA,,,

## 2023-11-03 PROCEDURE — 63600175 PHARM REV CODE 636 W HCPCS: Performed by: INTERNAL MEDICINE

## 2023-11-03 PROCEDURE — 25000003 PHARM REV CODE 250: Performed by: INTERNAL MEDICINE

## 2023-11-03 PROCEDURE — 25000003 PHARM REV CODE 250: Performed by: STUDENT IN AN ORGANIZED HEALTH CARE EDUCATION/TRAINING PROGRAM

## 2023-11-03 PROCEDURE — D9220A PRA ANESTHESIA: ICD-10-PCS | Mod: CRNA,,,

## 2023-11-03 PROCEDURE — 21400001 HC TELEMETRY ROOM

## 2023-11-03 PROCEDURE — 27000221 HC OXYGEN, UP TO 24 HOURS

## 2023-11-03 PROCEDURE — 63600175 PHARM REV CODE 636 W HCPCS

## 2023-11-03 PROCEDURE — 37000008 HC ANESTHESIA 1ST 15 MINUTES: Performed by: INTERNAL MEDICINE

## 2023-11-03 RX ORDER — SODIUM CHLORIDE 9 MG/ML
INJECTION, SOLUTION INTRAVENOUS CONTINUOUS
Status: DISCONTINUED | OUTPATIENT
Start: 2023-11-03 | End: 2023-11-03

## 2023-11-03 RX ORDER — PHENYLEPHRINE HCL IN 0.9% NACL 1 MG/10 ML
SYRINGE (ML) INTRAVENOUS
Status: DISCONTINUED | OUTPATIENT
Start: 2023-11-03 | End: 2023-11-03

## 2023-11-03 RX ORDER — ETOMIDATE 2 MG/ML
INJECTION INTRAVENOUS
Status: DISCONTINUED | OUTPATIENT
Start: 2023-11-03 | End: 2023-11-03

## 2023-11-03 RX ORDER — GLYCOPYRROLATE 0.2 MG/ML
INJECTION INTRAMUSCULAR; INTRAVENOUS
Status: DISCONTINUED | OUTPATIENT
Start: 2023-11-03 | End: 2023-11-03

## 2023-11-03 RX ORDER — SODIUM CHLORIDE 9 MG/ML
INJECTION, SOLUTION INTRAVENOUS CONTINUOUS
Status: ACTIVE | OUTPATIENT
Start: 2023-11-03 | End: 2023-11-03

## 2023-11-03 RX ORDER — KETAMINE HYDROCHLORIDE 100 MG/ML
INJECTION, SOLUTION INTRAMUSCULAR; INTRAVENOUS
Status: DISCONTINUED | OUTPATIENT
Start: 2023-11-03 | End: 2023-11-03

## 2023-11-03 RX ORDER — LIDOCAINE HYDROCHLORIDE 20 MG/ML
INJECTION INTRAVENOUS
Status: DISCONTINUED | OUTPATIENT
Start: 2023-11-03 | End: 2023-11-03

## 2023-11-03 RX ORDER — PROPOFOL 10 MG/ML
VIAL (ML) INTRAVENOUS
Status: DISCONTINUED | OUTPATIENT
Start: 2023-11-03 | End: 2023-11-03

## 2023-11-03 RX ADMIN — PROPOFOL 10 MG: 10 INJECTION, EMULSION INTRAVENOUS at 03:11

## 2023-11-03 RX ADMIN — LIDOCAINE HYDROCHLORIDE 80 MG: 20 INJECTION INTRAVENOUS at 03:11

## 2023-11-03 RX ADMIN — Medication 100 MCG: at 03:11

## 2023-11-03 RX ADMIN — PIPERACILLIN AND TAZOBACTAM 4.5 G: 4; .5 INJECTION, POWDER, LYOPHILIZED, FOR SOLUTION INTRAVENOUS; PARENTERAL at 12:11

## 2023-11-03 RX ADMIN — ETOMIDATE 5 MG: 2 INJECTION INTRAVENOUS at 03:11

## 2023-11-03 RX ADMIN — SODIUM CHLORIDE: 9 INJECTION, SOLUTION INTRAVENOUS at 08:11

## 2023-11-03 RX ADMIN — KETAMINE HYDROCHLORIDE 10 MG: 100 INJECTION, SOLUTION, CONCENTRATE INTRAMUSCULAR; INTRAVENOUS at 03:11

## 2023-11-03 RX ADMIN — HYDROCODONE BITARTRATE AND ACETAMINOPHEN 1 TABLET: 10; 325 TABLET ORAL at 09:11

## 2023-11-03 RX ADMIN — HYDROCODONE BITARTRATE AND ACETAMINOPHEN 1 TABLET: 10; 325 TABLET ORAL at 08:11

## 2023-11-03 RX ADMIN — SODIUM CHLORIDE, SODIUM GLUCONATE, SODIUM ACETATE, POTASSIUM CHLORIDE AND MAGNESIUM CHLORIDE: 526; 502; 368; 37; 30 INJECTION, SOLUTION INTRAVENOUS at 03:11

## 2023-11-03 RX ADMIN — AZITHROMYCIN MONOHYDRATE 500 MG: 500 INJECTION, POWDER, LYOPHILIZED, FOR SOLUTION INTRAVENOUS at 09:11

## 2023-11-03 RX ADMIN — GLYCOPYRROLATE 0.2 MG: 0.2 INJECTION INTRAMUSCULAR; INTRAVENOUS at 03:11

## 2023-11-03 RX ADMIN — PIPERACILLIN AND TAZOBACTAM 4.5 G: 4; .5 INJECTION, POWDER, LYOPHILIZED, FOR SOLUTION INTRAVENOUS; PARENTERAL at 08:11

## 2023-11-03 NOTE — TRANSFER OF CARE
"Anesthesia Transfer of Care Note    Patient: Ye Vergara    Procedure(s) Performed: Procedure(s) (LRB):  SIG (Left)    Patient location: GI    Anesthesia Type: MAC    Transport from OR: Transported from OR on room air with adequate spontaneous ventilation    Post pain: adequate analgesia    Post assessment: no apparent anesthetic complications    Post vital signs: stable    Level of consciousness: awake    Nausea/Vomiting: no nausea/vomiting    Complications: none    Transfer of care protocol was followed      Last vitals:   Visit Vitals  BP (!) 99/58 (BP Location: Left arm, Patient Position: Lying)   Pulse 76   Temp 36.8 °C (98.2 °F)   Resp 19   Ht 5' 8.9" (1.75 m)   Wt 73.4 kg (161 lb 13.1 oz)   SpO2 100%   BMI 23.97 kg/m²     "

## 2023-11-03 NOTE — PHYSICIAN QUERY
"PT Name: Ye Vergara  MR #: [id number]     DOCUMENTATION CLARIFICATION     CDS/: Eben Padilla RN, CCDS              Contact information:   [email or web address]     This form is a permanent document in the medical record.     Query Date: November 3, 2023    By submitting this query, we are merely seeking further clarification of documentation.  Please utilize your independent clinical judgment when addressing the question(s) below.  The Medical Record contains the following   Indicators   Supporting Clinical Findings Location in Medical Record   x Documentation of Respiratory Failure, ARDS Multifactorial acute hypoxemic respiratory failure 10/30  H&P: HM and   HM 10/31-11/2       x Subjective Respiratory Signs/Symptoms Positive for cough and shortness of breath.;  productive green sputum for several days,    Chief complaint: Dyspnea 10/30  ED provider       10/30  H&P: HM       x Objective Respiratory Signs/Symptoms ED: Bilateral crackles in all fields      Lungs :  Sounds with bilateral rhonchi, no accessory muscle use     Chest: Clear to auscultation bilaterally   10/30  ED provider    10/30 H&P:     11/1  HM   x RR     O2 sat     O2 use 10/30@1400     RR: 23;  sats: 95%    10/30@1600    RR: 20;  sats 100%  10/31@0732       RR: 18;  sats   83% (1 time in ED) ;      RR 19   sats 96 %  31/ Resp  Min: 15  Max: 24; SpO2  Min: 94 %  Max: 100 %  11/1  Resp  Min: 13  Max: 21; SpO2  Min: 83 %  Max: 99 %  11/2  Resp  Min: 17  Max: 20;  SpO2  Min: 90 %  Max: 100 %  ED  "  "    10/30 H&P: HM  10/31  HM  11/1  HM  11/2  HM      Blood Gas (ABG or VBG)     x Hypoxia/Hypercapnia Multifactorial acute hypoxemic respiratory failure 10/30  H&P; HM      BiPAP/Intubation/Mechanical Ventilation     x Home O2, Oxygen Dependence he is on oxygen at home.     requiring 4 L nasal cannula to maintain adequate sats    COPD, not on home oxygen  10/30 ED provider    H&P:     10/31  HM     x Acute/Chronic Illness " Multifactorial acute hypoxemic respiratory failure  Community-acquired pneumonia contributing to above  Decompensated systolic congestive CHF contributing to above  NSTEMI/type 2 demand ischemia 2/2 above  Lung cancer  Colon cancer, status post colectomy 10/31  HM    Treatment     x Other Supplemental O2; 3-4LNC ; sats: 90-99% 10/30-11/3 flow sheet          The clinical guidelines noted are only a system guideline. It does not replace the providers clinical judgment.    [facility] Approved Diagnostic Criteria      Acute Respiratory Failure    Hypoxic: ABG pO2<60 mmHg or O2 sat of <91% on RA   AND/OR   Hypercapnic: ABG pCO2>50 mmHg with pH <7.35   AND   Respiratory symptoms documented (Subjective: SOB; Objective: Tachypnea, respiratory distress, increased work of breathing, unable to speak in complete sentences, labored breathing, use of accessory muscles, RR>26, cyanosis, dyspnea, wheezing, stridor, lethargy)      Chronic Respiratory Failure   Hypoxic: Continuous home oxygen    AND/OR   Hypercapnic: Normal pH with high CO2 (ex. COPD)      Acute on Chronic Respiratory Failure   Hypoxic: ABG pO2>10mmHg below baseline OR ABG pO2<60 mmHg OR SpO2<91% on usual home O2  OR O2>2L/min over baseline home O2   AND/OR   Hypercapnic: ABG pCO2>50 mmHg OR pCO2>10mmHg over baseline and pH <7.35   AND   Respiratory symptoms documented      Acute Respiratory Distress Syndrome (ARDS) - an acute, diffuse, inflammatory form of lung injury. Suspect with progressive dyspnea, hypoxemic respiratory failure, and bilateral alveolar infiltrates on chest imaging within 6 to 72 hours of an inciting event.   Acute Respiratory Distress - Generally describes less severe respiratory symptoms (tachypnea, in respiratory distress, increased work of breathing, unable to speak in complete sentences, labored breathing, use of accessory muscles, RR> 24, cyanosis, dyspnea, wheezing, stridor, lethargy) without sufficient measurements (pO2,  SpO2, pH, and pCO2) to meet criteria for respiratory failure)   Acute Respiratory Insufficiency - Generally describes less severe respiratory symptoms and measurements (pO2, SpO2, pH, and pCO2) not meeting criteria for respiratory failure         Due to the conflicting clinical picture, please clinically validate the diagnosis of _ Acute hypoxemic respiratory failure_.    If validated, please provide additional clinical support for the diagnosis.     [    ] Above stated diagnosis is not confirmed and/or it has been ruled out, other diagnosis ruled in:  [   ] acute respiratory distress  [   ] acute respiratory insufficiency  [   ] chronic respiratory failure on continuous home oxygen at (please specify): _____ L/NC  [   ] other (please specify):____________________     [   x ] Acute Respiratory Failure with Hypoxia diagnosis is confirmed and additional clinical support/decision-making indicators for the diagnosis include   (please indicate symptoms):________Community acquired pneumonia_______________________________________     [    ] Other clarification (please specify): ___________________       Please document in your progress notes daily for the duration of treatment until resolved and include in your discharge summary.     Reference:    GAVIN Wray MD. (2020, March 13). Acute respiratory distress syndrome: Clinical features, diagnosis, and complications in adults ([id number] RUBI Mejia MD & [id number] EDELMIRA Leggett MD, Eds.). Retrieved November 13, 2020, from [email or web address]=ards&source=search_result&selectedTitle=1~150&usage_type=default&display_rank=1  Form No. 92738

## 2023-11-03 NOTE — ANESTHESIA POSTPROCEDURE EVALUATION
Anesthesia Post Evaluation    Patient: Ye Vergara    Procedure(s) Performed: Procedure(s) (LRB):  SIG (Left)    Final Anesthesia Type: general (/Regional//MAC)      Patient location during evaluation: PACU  Post-procedure mental status: @ basline.  Pain management: adequate    PONV status: See postop meds for drugs used to control n/v if any.  Anesthetic complications: no      Cardiovascular status: blood pressure returned to baseline  Respiratory status: @ baseline.  Hydration status: euvolemic            Vitals Value Taken Time   /69 11/03/23 1602   Temp 98 11/03/23 1622   Pulse 101 11/03/23 1602   Resp 18 11/03/23 1548   SpO2 93 % 11/03/23 1602         No case tracking events are documented in the log.      Pain/Sanam Score: Pain Rating Prior to Med Admin: 6 (11/3/2023  8:11 AM)  Pain Rating Post Med Admin: 0 (11/3/2023  9:11 AM)  Sanam Score: 10 (11/3/2023  3:56 PM)        
10

## 2023-11-03 NOTE — PROGRESS NOTES
"   Acute Care Surgery   Progress Note  Admit Date: 10/30/2023  HD#4  POD#* No surgery found *    Subjective:   Interval history:  NAEO. Afebrile. Heart rate in 80's. BP normal. UOP 320cc. Denies any further hematochezia. Has pain over left inguinal hernia.  Home Meds:No current outpatient medications   Scheduled Meds:   azithromycin  500 mg Intravenous Q24H    magnesium sulfate IVPB  2 g Intravenous Once    piperacillin-tazobactam (Zosyn) IV (PEDS and ADULTS) (extended infusion is not appropriate)  4.5 g Intravenous Q8H     Continuous Infusions:  PRN Meds:acetaminophen, acetaminophen, albuterol-ipratropium, HYDROcodone-acetaminophen, melatonin, sodium chloride 0.9%, sodium chloride 0.9%     Objective:     VITAL SIGNS: 24 HR MIN & MAX LAST   Temp  Min: 97.3 °F (36.3 °C)  Max: 97.6 °F (36.4 °C)  97.4 °F (36.3 °C)   BP  Min: 89/59  Max: 101/72  (!) 89/59    Pulse  Min: 69  Max: 92  88    Resp  Min: 16  Max: 18  18    SpO2  Min: 90 %  Max: 99 %  99 %      HT: 5' 8.9" (175 cm)  WT: 73.4 kg (161 lb 13.1 oz)  BMI: 24     Intake/output:  Intake/Output - Last 3 Shifts         11/01 0700  11/02 0659 11/02 0700 11/03 0659 11/03 0700 11/04 0659    P.O.  600     Total Intake(mL/kg)  600 (8.2)     Urine (mL/kg/hr) 3660 (2.1) 320 (0.2)     Emesis/NG output  0     Stool 0 0     Total Output 3660 320     Net -3660 +280            Urine Occurrence 2 x 0 x     Stool Occurrence 0 x 3 x     Emesis Occurrence  0 x             Intake/Output Summary (Last 24 hours) at 11/3/2023 0708  Last data filed at 11/2/2023 1300  Gross per 24 hour   Intake 600 ml   Output 320 ml   Net 280 ml           Lines/drains/airway:       Peripheral IV - Single Lumen 10/31/23 0747 20 G Left Antecubital (Active)   Site Assessment Clean;Dry;Intact 11/03/23 0400   Extremity Assessment Distal to IV No abnormal discoloration 11/01/23 1800   Line Status Capped;Saline locked 11/03/23 0400   Dressing Status Clean;Dry;Intact 11/03/23 0400   Dressing Intervention " Integrity maintained 11/03/23 0400   Number of days: 2            Peripheral IV - Single Lumen 10/31/23 1736 20 G Anterior;Right Forearm (Active)   Site Assessment Clean;Dry;Intact 11/03/23 0400   Extremity Assessment Distal to IV No abnormal discoloration 11/01/23 1800   Line Status Infusing 11/03/23 0400   Dressing Status Clean;Dry;Intact 11/03/23 0400   Dressing Intervention Integrity maintained 11/03/23 0400   Number of days: 2            Colostomy RLQ (Active)   Stomal Appliance 2 piece;Clean;Dry;Intact 11/02/23 2000   Stoma Appearance rosebud appearance 11/01/23 2000   Site Assessment Clean;Dry;Intact 11/02/23 2000   Peristomal Assessment Clean;Dry;Intact 11/02/23 2000   Accessories/Skin Care skin barrier ring 11/02/23 1629   Stoma Function flatus 11/02/23 1830   Treatment Bag change;Site care 10/30/23 1430   Number of days:        Physical examination:  Gen: NAD, AAOx3, answering questions appropriately  HEENT: MARY ELLEN  CV: RR  Resp: NWOB  Abd: S/NT/ND. Colostomy mucosa pink.  Ext: moving all extremities spontaneously and purposefully  Neuro: CN II-XII grossly intact    Labs:  Renal:  Recent Labs     11/01/23  0419 11/02/23  0411 11/03/23  0500   BUN 26.7* 21.0 24.7   CREATININE 0.87 0.89 1.27*     Recent Labs     11/02/23  1553   LACTIC 1.1     FENGI:  Recent Labs     11/01/23  0419 11/02/23  0411 11/03/23  0500    134* 139   K 3.4* 3.3* 3.6   CO2 27 33* 41*   CALCIUM 7.9* 7.8* 8.4*   MG 1.30* 1.50*  --    ALBUMIN  --  2.7* 2.8*   BILITOT  --  1.6* 1.4   AST  --  37* 30   ALKPHOS  --  144 133   ALT  --  55 45     Heme:  Recent Labs     10/31/23  1539 11/01/23  0419 11/01/23  0803 11/01/23 2008 11/02/23  0411 11/02/23  1809 11/03/23  0500   HGB 15.6 15.4  --  14.2 15.3 13.8* 14.4   HCT 49.4 48.5  --  44.7 47.7 43.0 46.3    126*  --   --  133  --  167   PTT 29.7  --  64.7*  --   --   --   --    INR 1.2  --   --   --  1.3  --   --      ID:  Recent Labs     10/31/23  1539 11/01/23  0419  "11/02/23  0411 11/03/23  0500   WBC 16.79* 22.08* 25.39* 19.45*     CBG:  Recent Labs     11/01/23 0419 11/02/23  0411 11/03/23  0500   GLUCOSE 99 91 79*      Cardiovascular:  Recent Labs   Lab 10/30/23  1514 10/30/23  2043 10/31/23  0244   TROPONINI 0.085* 0.047* 0.046*   BNP 3,544.2*  --   --      ABG:  No results for input(s): "PH", "PO2", "PCO2", "HCO3", "BE" in the last 168 hours.   I have reviewed all pertinent lab results within the past 24 hours.    Imaging:  CT Abdomen Pelvis With IV Contrast   Final Result      Surgical changes consistent with partial colon resection.  There appears to be a a blind-ending colon loop at the level of the mid transverse colon in the upper abdomen.  There is a localized region of suspected severe edematous wall thickening of the proximal sigmoid colon left lower quadrant with mild regional stranding which could indicate colitis or possible infiltrative mass.  Enhancement of the blind-ending descending duodenum in the left abdomen could indicate colitis as well.      Surgical changes in the pelvis and likely treatment related thickening along the presacral space.      Left inguinal hernia containing bowel loops without obstruction.      There is a 6 mm hypodensity in the liver, nonspecific.  Malignancy not excluded.      Other incidental findings as above.      Agree with nighthawk.         Electronically signed by: Kaylynn Painting MD   Date:    11/02/2023   Time:    09:11      X-Ray Chest PA And Lateral   Final Result         I have reviewed all pertinent imaging results/findings within the past 24 hours.    Micro/Path/Other:  Microbiology Results (last 7 days)       Procedure Component Value Units Date/Time    Blood Culture [[id number]]  (Normal) Collected: 10/30/23 1514    Order Status: Completed Specimen: Blood Updated: 11/02/23 1601     CULTURE, BLOOD ([facility]) No Growth At 72 Hours    Blood Culture [[id number]]  (Normal) Collected: 10/30/23 1514    Order Status: Completed " Specimen: Blood Updated: 11/02/23 1601     CULTURE, BLOOD ([facility]) No Growth At 72 Hours    Respiratory Culture [[id number]]  (Abnormal) Collected: 10/31/23 0626    Order Status: Completed Specimen: Sputum, Expectorated Updated: 11/02/23 0754     Respiratory Culture Moderate Yeast     Comment: with normal respiratory lashell        GRAM STAIN Quality 2+      Few Gram positive cocci      Few Gram Positive Rods      Few Yeast           Specimen (168h ago, onward)      None             Assessment & Plan:   62 yo M with PMH of CHF (EF<20%) s/p AICD, CAD s/p PCI, HTN, COPD, Lung cancer s/p radiation, colon cancer s/p transverse colectomy c/b perforation? S/p R sided colostomy with long brice's, questionable colovesical fistula. Also has LV thrombus, on coumadin.    Lower GI bleed likely secondary to recently starting anticoagulation for LV thrombus  Will follow up Flex sig results today  Diversion colitis certainly a possibility as well  Surgery remains available as needed  No surgery indicated for non-obstructive left inguinal hernia in this very poor surgical candidate    Rohith Brown MD  General Surgery [facility]

## 2023-11-03 NOTE — ANESTHESIA PREPROCEDURE EVALUATION
11/03/2023  Ye Vergara is a 63 y.o., male.    w/ PMH CHF (EF<20) s/p ICD, CAD s/p PCI (2022), HTN, COPD, lung cancer s/p XRT, colon cancer s/p colectomy (2018?) c/b adhesions causing perforation resulting in diverting R sided colostomy with plans of revision - however no revision has occurred - pt still has L sided colon connected to rectum per imaging. Pt admitted for CAP. During workup, echo revealed LV thrombus. Cardiology started heparin gtt with coumadin.  Pt had episode of rectal bleeding overnight 11/1 and during the day 11/2. Heparin and coumadin stopped. GI consulted, planning for flex sig 11/3 with fleet enema prep. CTAP demonstrated blind ending colon loop at level of mid transverse colon, localized severe edematous wall thickening of proximal sigmoid colon LLQ w/ mild regional stranding - possible colitis, possible infiltrative mass as well as enhancement of blind ending descending duodenum in L abdomen - possible colitis as well. Hgb stable 15.3 from 14.2. Pt states this has never happened before. Of note, has a large L sided inguinal hernia, reducible. Pt endorses pain where L sided hernia is - pain improves with reduction of hernia.   Pre-op Assessment    I have reviewed the Patient Summary Reports.     I have reviewed the Nursing Notes. I have reviewed the NPO Status.   I have reviewed the Medications.     Review of Systems      Physical Exam  General: Well nourished, Cooperative, Alert and Oriented    Airway:  Mallampati: II   Mouth Opening: Normal  TM Distance: Normal  Tongue: Normal  Neck ROM: Normal ROM    Dental:  Edentulous        Anesthesia Plan  Type of Anesthesia, risks & benefits discussed:    Anesthesia Type: Gen Natural Airway  Intra-op Monitoring Plan: Standard ASA Monitors  Post Op Pain Control Plan: multimodal analgesia  Induction:  IV  Informed Consent: Informed consent  signed with the Patient and all parties understand the risks and agree with anesthesia plan.  All questions answered. Patient consented to blood products? Yes  ASA Score: 4  Day of Surgery Review of History & Physical: H&P Update referred to the surgeon/provider.    Ready For Surgery From Anesthesia Perspective.     .

## 2023-11-03 NOTE — PROGRESS NOTES
[facility] Hospital Medicine Progress Note        Chief Complaint: Inpatient Follow-up pneumonia    HPI:   63-year-old male with a history of CHF with EF less than 20% status post ICD, CAD status post PCI 2022, essential hypertension, COPD, lung cancer status post XRT, colon cancer status post colectomy who was recently admitted at [facility] for sepsis from community-acquired pneumonia but reported left 2 days ago against medical advice.  He presents to the ER tonight complaining of persistent dyspnea, cough with purulent green mucus production.  He also complains of left groin pain, has a chronic left groin hernia, but states it easily reducible and he has been told he is not a surgical candidate due to his comorbidities.     He arrived afebrile but tachycardic and mildly tachypneic, hemodynamically stable and requiring 4 L nasal cannula to maintain adequate sats.  Laboratory work showed leukocytosis of 15 K, mild transaminitis, a BNP of 3500 and a mildly elevated troponin of 0.085.  Influenza and COVID testing was negative.  Chest x-ray showed ill-defined bilateral mid/lower zone hazy infiltrates concerning for developing atypical process.  Echocardiogram revealed LV thrombus.  Cardiology started on heparin drip with Coumadin.  Will need lifelong Coumadin and follow up.    CT of the abdomen and pelvis demonstrated a blind ending:  Low level of mid transverse colon, localized severe edema does wall thickening of the proximal sigmoid colon left lower quadrant with mild regional standing, possible colitis, possible infiltrative mass as well as enhancement of the blind ending descending duodenum and left abdomen, possible colitis as well.  He was started on antibiotics for possible colitis.    Continued to have rectal bleeding Coumadin and heparin drip was held.  However hemoglobin remained stable.  Scheduled for a flex sig on 11/3 with GI.  General surgery was consulted  however no indication for surgery at this time.      Interval Hx:  Continues to have some rectal bleeding.  Coumadin and heparin continued to be on hold.  Still has some lower abdominal pain.  Plan for sigmoidoscopy today.  No other acute events overnight.  Seen and examined by bedside.  NPO at this time due to possible procedure today.  Hemoglobin stable at this time.     Objective/physical exam:  General: In no acute distress, afebrile  Chest: Clear to auscultation bilaterally  Heart: RRR, +S1, S2, no appreciable murmur  Abdomen: Soft, nontender, BS +  MSK: Warm, no lower extremity edema, no clubbing or cyanosis  Neurologic: Alert and oriented x4, Cranial nerve II-XII intact, Strength 5/5 in all 4 extremities     VITAL SIGNS: 24 HRS MIN & MAX LAST   Temp  Min: 97.3 °F (36.3 °C)  Max: 98.2 °F (36.8 °C) 98 °F (36.7 °C)   BP  Min: 89/59  Max: 118/68 107/72   Pulse  Min: 76  Max: 107  90   Resp  Min: 18  Max: 19 18   SpO2  Min: 93 %  Max: 100 % (!) 94 %       Recent Labs   Lab 11/01/23 0419 11/01/23 2008 11/02/23 0411 11/02/23  1809 11/03/23  0500   WBC 22.08*  --  25.39*  --  19.45*   RBC 5.25  --  5.19  --  4.91   HGB 15.4   < > 15.3 13.8* 14.4   HCT 48.5   < > 47.7 43.0 46.3   MCV 92.4  --  91.9  --  94.3*   MCH 29.3  --  29.5  --  29.3   MCHC 31.8*  --  32.1*  --  31.1*   RDW 15.4  --  14.9  --  15.0   *  --  133  --  167   MPV 12.3*  --  12.6*  --  12.3*    < > = values in this interval not displayed.         Recent Labs   Lab 10/31/23  0244 11/01/23 0419 11/02/23 0411 11/03/23  0500    139 134* 139   K 4.3 3.4* 3.3* 3.6   CO2 28 27 33* 41*   BUN 33.1* 26.7* 21.0 24.7   CREATININE 1.10 0.87 0.89 1.27*   CALCIUM 8.6* 7.9* 7.8* 8.4*   MG  --  1.30* 1.50*  --    ALBUMIN 2.8*  --  2.7* 2.8*   ALKPHOS 114  --  144 133   ALT 99*  --  55 45   AST 62*  --  37* 30   BILITOT 1.3  --  1.6* 1.4            Microbiology Results (last 7 days)       Procedure Component Value Units Date/Time    Blood Culture  [[id number]]  (Normal) Collected: 10/30/23 1514    Order Status: Completed Specimen: Blood Updated: 11/03/23 1601     CULTURE, BLOOD ([facility]) No Growth At 96 Hours    Blood Culture [[id number]]  (Normal) Collected: 10/30/23 1514    Order Status: Completed Specimen: Blood Updated: 11/03/23 1601     CULTURE, BLOOD ([facility]) No Growth At 96 Hours    Respiratory Culture [[id number]]  (Abnormal) Collected: 10/31/23 0626    Order Status: Completed Specimen: Sputum, Expectorated Updated: 11/02/23 0754     Respiratory Culture Moderate Yeast     Comment: with normal respiratory lashell        GRAM STAIN Quality 2+      Few Gram positive cocci      Few Gram Positive Rods      Few Yeast             Radiology:  CT Abdomen Pelvis With IV Contrast  Narrative: EXAMINATION:  CT ABDOMEN PELVIS WITH IV CONTRAST    CLINICAL HISTORY:  Bowel obstruction suspected;    TECHNIQUE:  Low dose axial images, sagittal and coronal reformations were obtained from the lung bases to the pubic symphysis following the IV administration of 100 mL of Omnipaque 350 .  Oral contrast given.  .  Automated exposure control used.    COMPARISON:  None.    FINDINGS:  Liver demonstrates normal enhancement.  There is a nonspecific appearing hypodensity in the right hepatic lobe measuring 6 mm on image 37 series 2.    Gallbladder contracted.  Possible cholelithiasis.  Biliary ductal system normal.  Pancreas normal.  Stomach, spleen, adrenal glands normal.    Kidneys mild renal core cortical thinning, prominent renal cortical scarring bilaterally.  No hydronephrosis.    Aorta tapers normally.  Mild moderate atherosclerotic calcification.    Surgical changes consistent with partial colon resection.  There is a ascending colon colostomy in the right upper quadrant.  There appears to be a blind-ending loop of colon including the descending, sigmoid colon with the blind in near the mid transverse colon region.  There is a region suspected severe thickening of the mid to  proximal sigmoid colon wall and enhancement of the descending colon proximally.  Moderate stranding noted in the left lower quadrant adjacent to the colon.  Surgical clips noted in the pelvis bilaterally.  Thickening in the presacral space abutting the rectum wall posteriorly possible treatment related changes.  Small bowel loops normal.  No significant ascites.  No significant lymphadenopathy in the abdomen or pelvis.    Left inguinal hernia containing multiple bowel loops extending nearly to the scrotum.  No definite evidence of incarceration or obstruction.    Bladder and rectum otherwise unremarkable.    Bones intact.    Severe centrilobular emphysematous changes the lung bases.  Mild moderate pleural effusion on the right, dependent atelectasis bilaterally.  Localized segmental area of infiltrate dependent right lower lobe.  Impression: Surgical changes consistent with partial colon resection.  There appears to be a a blind-ending colon loop at the level of the mid transverse colon in the upper abdomen.  There is a localized region of suspected severe edematous wall thickening of the proximal sigmoid colon left lower quadrant with mild regional stranding which could indicate colitis or possible infiltrative mass.  Enhancement of the blind-ending descending duodenum in the left abdomen could indicate colitis as well.    Surgical changes in the pelvis and likely treatment related thickening along the presacral space.    Left inguinal hernia containing bowel loops without obstruction.    There is a 6 mm hypodensity in the liver, nonspecific.  Malignancy not excluded.    Other incidental findings as above.    Agree with macario.    Electronically signed by: Kaylynn Painting MD  Date:    11/02/2023  Time:    09:11      Scheduled Med:   azithromycin  500 mg Intravenous Q24H    magnesium sulfate IVPB  2 g Intravenous Once    piperacillin-tazobactam (Zosyn) IV (PEDS and ADULTS) (extended infusion is not appropriate)   4.5 g Intravenous Q8H          PRN Meds:  acetaminophen, acetaminophen, albuterol-ipratropium, HYDROcodone-acetaminophen, melatonin, sodium chloride 0.9%, sodium chloride 0.9%       Assessment/Plan:   Multifactorial acute hypoxemic respiratory failure  Community-acquired pneumonia contributing to above-respiratory culture growing few Gram-positive cocci and few Gram-positive rods.  Patient on Zosyn at this time;   Decompensated systolic congestive CHF contributing to above  Rectal bleeding-likely secondary to ischemic colitis, flex sig done today showed mild-to-moderate proctitis with large blood clots, extremely limited view.  LV thrombus- discovered on echo  ARF/dehydration- likely secondary to dehydration and possible ATN due to lower blood pressures  Leukocytosis-reactive secondary to above  NSTEMI/type 2 demand ischemia 2/2 above  Left groin inguinal hernia  CHF, documented LVEF less than 20%, 10/2022 s/p ICD   CAD s/p PCI 10/2022  Essential hypertension   COPD, not on home oxygen   Lung cancer  Colon cancer, status post colectomy    -Rectal bleeding in a patient who has colostomy,   -CT shows colitis, concern for ischemic colitis giving his history.  -discontinued Coreg and Lasix due to lower blood pressures; stable at this time  -appreciate GI recommendations  -general surgery recommends no surgery at this time and possible IR for embolization for continued bleeding  - possibility of liver nodule, patient will need imaging studies to confirm if this is a metastatic lesion giving his history.  - if patient has continued bleeding tomorrow will consult IR for embolization  - continue Zosyn at this time for the colitis  -patient receiving last dose of azithromycin today for pneumonia,  -continue to hold heparin and Coumadin at this time due to rectal bleeding  -give gentle IV fluids due to weakening renal function this a.m. 50ml/hr x 6 hours  -monitor hemoglobin every 12 hours  -labs in a.m.    Valerie Carias  DO  Department of Hospital Medicine  [place]  11/03/2023       All diagnosis and differential diagnosis have been reviewed; assessment and plan has been documented; I have personally reviewed the labs and test results that are presently available; I have reviewed the patients medication list; I have reviewed the consulting providers response and recommendations. I have reviewed or attempted to review medical records based upon their availability    All of the patient's questions have been  addressed and answered. Patient's is agreeable to the above stated plan. I will continue to monitor closely and make adjustments to medical management as needed.  _____________________________________________________________________

## 2023-11-03 NOTE — PROGRESS NOTES
[facility] - 4th Floor Medical Telemetry    Cardiology  Progress Note    Patient Name: Ye Vergara  MRN: [id number]  Admission Date: 10/30/2023  Hospital Length of Stay: 4 days  Code Status: Full Code   Attending Physician: Germán Benoit MD   Primary Care Physician: Aaron Weeks MD  Expected Discharge Date:   Principal Problem:CAP (community acquired pneumonia)    Subjective:     Brief HPI:   Mr. Vergara is a 62 y/o male who initially presented to the ED 10/30/2023 with complaints of dyspnea on exertion and productive cough with green sputum.  Per chart review it appears patient was recently admitted at [facility] for sepsis from CAP but left AMA 2 days ago.  Lab work on admission was notable for leukocytosis with WBC 15, BNP 3500, elevated trop of 0.085.  CXR performed showing ill-defined bilateral mid/lower zone hazy infiltrates concerning for developing atypical process.  Patient has known history of HF with EF 20%.  Patient was given lasix 40mg BID with good urine output.  Cardiology was consulted in setting of CHF exacerbation and NSTEMI.      Hospital Course:   Echo w/ EF 15-20% and LV mural thrombus noted - started on heparin gtt 10/31/2023 and coumadin on 11/1/2023 - goal INR 2-3  Urine output does not appear to be charted appropriately for last 24 hours.    Remains on 4L NC   Heparin and warfarin remain on hold secondary to concern for GI bleed.  GI and surgery following.   Patient denies any chest pain or shortness of breath.  Continues to complain of pain near hernia site.  No new episodes of bleeding reported, H/H stable. Plan for flex sig today for further evaluation.       Review of Systems   Cardiovascular:  Negative for chest pain, leg swelling and palpitations.   Respiratory:  Positive for cough, shortness of breath and sputum production.    Gastrointestinal:  Negative for nausea and vomiting.       Objective:     Vital Signs (Most Recent):  Temp: 97.9 °F (36.6 °C)  (11/03/23 0726)  Pulse: 79 (11/03/23 0726)  Resp: 18 (11/03/23 0811)  BP: 102/65 (11/03/23 0726)  SpO2: 99 % (11/03/23 0726) Vital Signs (24h Range):  Temp:  [97.3 °F (36.3 °C)-97.9 °F (36.6 °C)] 97.9 °F (36.6 °C)  Pulse:  [69-90] 79  Resp:  [16-18] 18  SpO2:  [90 %-99 %] 99 %  BP: ()/(59-65) 102/65     Weight: 73.4 kg (161 lb 13.1 oz)  Body mass index is 23.97 kg/m².    SpO2: 99 %         Intake/Output Summary (Last 24 hours) at 11/3/2023 0939  Last data filed at 11/2/2023 1300  Gross per 24 hour   Intake 600 ml   Output 320 ml   Net 280 ml         Lines/Drains/Airways       Drain  Duration                  Colostomy RLQ -- days              Peripheral Intravenous Line  Duration                  Peripheral IV - Single Lumen 10/31/23 0747 20 G Left Antecubital 3 days         Peripheral IV - Single Lumen 10/31/23 1736 20 G Anterior;Right Forearm 2 days                    Significant Labs:   Recent Results (from the past 72 hour(s))   Echo    Collection Time: 10/31/23 12:41 PM   Result Value Ref Range    BSA 1.82 m2    Guerra's Biplane MOD Ejection Fraction 17 %    LVOT stroke volume 27.49 cm3    LVIDd 6.44 (A) 3.5 - 6.0 cm    LV Systolic Volume 152.00 mL    LV Systolic Volume Index 83.1 mL/m2    LVIDs 5.57 (A) 2.1 - 4.0 cm    LV Diastolic Volume 211.00 mL    LV Diastolic Volume Index 115.30 mL/m2    IVS 0.81 0.6 - 1.1 cm    LVOT diameter 1.90 cm    LVOT area 2.8 cm2    FS 14 (A) 28 - 44 %    Left Ventricle Relative Wall Thickness 0.28 cm    Posterior Wall 0.89 0.6 - 1.1 cm    LV mass 227.12 g    LV Mass Index 124 g/m2    MV Peak E Mikie 0.68 m/s    TDI LATERAL 0.06 m/s    TDI SEPTAL 0.04 m/s    E/E' ratio 13.60 m/s    MV Peak A Mikie 0.66 m/s    TR Max Mikie 3.25 m/s    E/A ratio 1.03     E wave deceleration time 119.00 msec    LV SEPTAL E/E' RATIO 17.00 m/s    LV LATERAL E/E' RATIO 11.33 m/s    LVOT peak mikie 0.70 m/s    Left Ventricular Outflow Tract Mean Velocity 0.44 cm/s    Left Ventricular Outflow Tract Mean  Gradient 1.00 mmHg    RVDD 3.35 cm    RV mid diameter 4.25 cm    TAPSE 1.74 cm    LA size 4.00 cm    LA volume (mod) 82.80 cm3    LA Volume Index (Mod) 45.2 mL/m2    RA Major Axis 4.76 cm    RA Width 4.54 cm    AV mean gradient 2 mmHg    AV peak gradient 4 mmHg    Ao peak marcello 0.97 m/s    Ao VTI 14.60 cm    LVOT peak VTI 9.70 cm    AV valve area 1.88 cm²    AV Velocity Ratio 0.72     AV index (prosthetic) 0.66     HOWARD by Velocity Ratio 2.05 cm²    MV mean gradient 2 mmHg    MV peak gradient 3 mmHg    MV valve area by continuity eq 2.48 cm2    MV VTI 11.1 cm    Triscuspid Valve Regurgitation Peak Gradient 42 mmHg    Mean e' 0.05 m/s    ZLVIDS 4.55     ZLVIDD 2.39    APTT    Collection Time: 10/31/23  3:39 PM   Result Value Ref Range    PTT 29.7 23.2 - 33.7 seconds   Protime-INR    Collection Time: 10/31/23  3:39 PM   Result Value Ref Range    PT 15.5 (H) 12.5 - 14.5 seconds    INR 1.2 <=1.3   CBC with Differential    Collection Time: 10/31/23  3:39 PM   Result Value Ref Range    WBC 16.79 (H) 4.50 - 11.50 x10(3)/mcL    RBC 5.29 4.70 - 6.10 x10(6)/mcL    Hgb 15.6 14.0 - 18.0 g/dL    Hct 49.4 42.0 - 52.0 %    MCV 93.4 80.0 - 94.0 fL    MCH 29.5 27.0 - 31.0 pg    MCHC 31.6 (L) 33.0 - 36.0 g/dL    RDW 15.8 11.5 - 17.0 %    Platelet 136 130 - 400 x10(3)/mcL    MPV 11.8 (H) 7.4 - 10.4 fL    Neut % 85.1 %    Lymph % 6.1 %    Mono % 6.4 %    Eos % 1.0 %    Basophil % 0.3 %    Lymph # 1.03 0.6 - 4.6 x10(3)/mcL    Neut # 14.30 (H) 2.1 - 9.2 x10(3)/mcL    Mono # 1.07 0.1 - 1.3 x10(3)/mcL    Eos # 0.16 0 - 0.9 x10(3)/mcL    Baso # 0.05 <=0.2 x10(3)/mcL    IG# 0.18 (H) 0 - 0.04 x10(3)/mcL    IG% 1.1 %    NRBC% 0.0 %   PTT Heparin Monitoring    Collection Time: 10/31/23 11:42 PM   Result Value Ref Range    PTT Heparin Monitor 85.5 (H) 23.2 - 33.7 seconds   Basic Metabolic Panel    Collection Time: 11/01/23  4:19 AM   Result Value Ref Range    Sodium Level 139 136 - 145 mmol/L    Potassium Level 3.4 (L) 3.5 - 5.1 mmol/L    Chloride  100 98 - 107 mmol/L    Carbon Dioxide 27 23 - 31 mmol/L    Glucose Level 99 82 - 115 mg/dL    Blood Urea Nitrogen 26.7 (H) 8.4 - 25.7 mg/dL    Creatinine 0.87 0.73 - 1.18 mg/dL    BUN/Creatinine Ratio 31     Calcium Level Total 7.9 (L) 8.8 - 10.0 mg/dL    Anion Gap 12.0 mEq/L    eGFR >60 mls/min/1.73/m2   Magnesium    Collection Time: 11/01/23  4:19 AM   Result Value Ref Range    Magnesium Level 1.30 (L) 1.60 - 2.60 mg/dL   CBC with Differential    Collection Time: 11/01/23  4:19 AM   Result Value Ref Range    WBC 22.08 (H) 4.50 - 11.50 x10(3)/mcL    RBC 5.25 4.70 - 6.10 x10(6)/mcL    Hgb 15.4 14.0 - 18.0 g/dL    Hct 48.5 42.0 - 52.0 %    MCV 92.4 80.0 - 94.0 fL    MCH 29.3 27.0 - 31.0 pg    MCHC 31.8 (L) 33.0 - 36.0 g/dL    RDW 15.4 11.5 - 17.0 %    Platelet 126 (L) 130 - 400 x10(3)/mcL    MPV 12.3 (H) 7.4 - 10.4 fL    Neut % 82.6 %    Lymph % 6.4 %    Mono % 8.0 %    Eos % 1.2 %    Basophil % 0.4 %    Lymph # 1.42 0.6 - 4.6 x10(3)/mcL    Neut # 18.23 (H) 2.1 - 9.2 x10(3)/mcL    Mono # 1.77 (H) 0.1 - 1.3 x10(3)/mcL    Eos # 0.26 0 - 0.9 x10(3)/mcL    Baso # 0.08 <=0.2 x10(3)/mcL    IG# 0.32 (H) 0 - 0.04 x10(3)/mcL    IG% 1.4 %    NRBC% 0.0 %    IPF 15.3 (H) 0.9 - 11.2 %   APTT    Collection Time: 11/01/23  8:03 AM   Result Value Ref Range    PTT 64.7 (H) 23.2 - 33.7 seconds   PTT Heparin Monitoring    Collection Time: 11/01/23  8:03 AM   Result Value Ref Range    PTT Heparin Monitor 63.7 (H) 23.2 - 33.7 seconds   PTT Heparin Monitoring    Collection Time: 11/01/23  5:01 PM   Result Value Ref Range    PTT Heparin Monitor 81.3 (H) 23.2 - 33.7 seconds   Hemoglobin and Hematocrit    Collection Time: 11/01/23  8:08 PM   Result Value Ref Range    Hgb 14.2 14.0 - 18.0 g/dL    Hct 44.7 42.0 - 52.0 %   Comprehensive Metabolic Panel    Collection Time: 11/02/23  4:11 AM   Result Value Ref Range    Sodium Level 134 (L) 136 - 145 mmol/L    Potassium Level 3.3 (L) 3.5 - 5.1 mmol/L    Chloride 88 (L) 98 - 107 mmol/L    Carbon  Dioxide 33 (H) 23 - 31 mmol/L    Glucose Level 91 82 - 115 mg/dL    Blood Urea Nitrogen 21.0 8.4 - 25.7 mg/dL    Creatinine 0.89 0.73 - 1.18 mg/dL    Calcium Level Total 7.8 (L) 8.8 - 10.0 mg/dL    Protein Total 5.3 (L) 5.8 - 7.6 gm/dL    Albumin Level 2.7 (L) 3.4 - 4.8 g/dL    Globulin 2.6 2.4 - 3.5 gm/dL    Albumin/Globulin Ratio 1.0 (L) 1.1 - 2.0 ratio    Bilirubin Total 1.6 (H) <=1.5 mg/dL    Alkaline Phosphatase 144 40 - 150 unit/L    Alanine Aminotransferase 55 0 - 55 unit/L    Aspartate Aminotransferase 37 (H) 5 - 34 unit/L    eGFR >60 mls/min/1.73/m2   Magnesium    Collection Time: 11/02/23  4:11 AM   Result Value Ref Range    Magnesium Level 1.50 (L) 1.60 - 2.60 mg/dL   Protime-INR    Collection Time: 11/02/23  4:11 AM   Result Value Ref Range    PT 15.7 (H) 12.5 - 14.5 seconds    INR 1.3 <=1.3   CBC with Differential    Collection Time: 11/02/23  4:11 AM   Result Value Ref Range    WBC 25.39 (H) 4.50 - 11.50 x10(3)/mcL    RBC 5.19 4.70 - 6.10 x10(6)/mcL    Hgb 15.3 14.0 - 18.0 g/dL    Hct 47.7 42.0 - 52.0 %    MCV 91.9 80.0 - 94.0 fL    MCH 29.5 27.0 - 31.0 pg    MCHC 32.1 (L) 33.0 - 36.0 g/dL    RDW 14.9 11.5 - 17.0 %    Platelet 133 130 - 400 x10(3)/mcL    MPV 12.6 (H) 7.4 - 10.4 fL    Neut % 89.6 %    Lymph % 3.9 %    Mono % 4.4 %    Eos % 0.5 %    Basophil % 0.4 %    Lymph # 0.99 0.6 - 4.6 x10(3)/mcL    Neut # 22.75 (H) 2.1 - 9.2 x10(3)/mcL    Mono # 1.11 0.1 - 1.3 x10(3)/mcL    Eos # 0.12 0 - 0.9 x10(3)/mcL    Baso # 0.11 <=0.2 x10(3)/mcL    IG# 0.31 (H) 0 - 0.04 x10(3)/mcL    IG% 1.2 %    NRBC% 0.0 %   Type & Screen    Collection Time: 11/02/23  4:11 AM   Result Value Ref Range    Group & Rh O POS     Indirect Delmy GEL NEG     Specimen Outdate 11/05/2023 23:59    ABORH RETYPE    Collection Time: 11/02/23  7:36 AM   Result Value Ref Range    ABORH Retype O POS    Lactic Acid, Plasma    Collection Time: 11/02/23  3:53 PM   Result Value Ref Range    Lactic Acid Level 1.1 0.5 - 2.2 mmol/L   Hemoglobin  and Hematocrit    Collection Time: 11/02/23  6:09 PM   Result Value Ref Range    Hgb 13.8 (L) 14.0 - 18.0 g/dL    Hct 43.0 42.0 - 52.0 %   Comprehensive Metabolic Panel    Collection Time: 11/03/23  5:00 AM   Result Value Ref Range    Sodium Level 139 136 - 145 mmol/L    Potassium Level 3.6 3.5 - 5.1 mmol/L    Chloride 88 (L) 98 - 107 mmol/L    Carbon Dioxide 41 (HH) 23 - 31 mmol/L    Glucose Level 79 (L) 82 - 115 mg/dL    Blood Urea Nitrogen 24.7 8.4 - 25.7 mg/dL    Creatinine 1.27 (H) 0.73 - 1.18 mg/dL    Calcium Level Total 8.4 (L) 8.8 - 10.0 mg/dL    Protein Total 5.7 (L) 5.8 - 7.6 gm/dL    Albumin Level 2.8 (L) 3.4 - 4.8 g/dL    Globulin 2.9 2.4 - 3.5 gm/dL    Albumin/Globulin Ratio 1.0 (L) 1.1 - 2.0 ratio    Bilirubin Total 1.4 <=1.5 mg/dL    Alkaline Phosphatase 133 40 - 150 unit/L    Alanine Aminotransferase 45 0 - 55 unit/L    Aspartate Aminotransferase 30 5 - 34 unit/L    eGFR >60 mls/min/1.73/m2   AFP Tumor Marker    Collection Time: 11/03/23  5:00 AM   Result Value Ref Range    Alpha Fetoprotein Level <2.00 <=8.90 ng/mL   CBC with Differential    Collection Time: 11/03/23  5:00 AM   Result Value Ref Range    WBC 19.45 (H) 4.50 - 11.50 x10(3)/mcL    RBC 4.91 4.70 - 6.10 x10(6)/mcL    Hgb 14.4 14.0 - 18.0 g/dL    Hct 46.3 42.0 - 52.0 %    MCV 94.3 (H) 80.0 - 94.0 fL    MCH 29.3 27.0 - 31.0 pg    MCHC 31.1 (L) 33.0 - 36.0 g/dL    RDW 15.0 11.5 - 17.0 %    Platelet 167 130 - 400 x10(3)/mcL    MPV 12.3 (H) 7.4 - 10.4 fL    Neut % 87.6 %    Lymph % 5.2 %    Mono % 5.1 %    Eos % 0.9 %    Basophil % 0.3 %    Lymph # 1.02 0.6 - 4.6 x10(3)/mcL    Neut # 17.03 (H) 2.1 - 9.2 x10(3)/mcL    Mono # 1.00 0.1 - 1.3 x10(3)/mcL    Eos # 0.17 0 - 0.9 x10(3)/mcL    Baso # 0.06 <=0.2 x10(3)/mcL    IG# 0.17 (H) 0 - 0.04 x10(3)/mcL    IG% 0.9 %    NRBC% 0.0 %       Physical Exam  Constitutional:       General: He is not in acute distress.     Appearance: Normal appearance. He is normal weight.   HENT:      Head: Normocephalic  and atraumatic.   Eyes:      Extraocular Movements: Extraocular movements intact.      Pupils: Pupils are equal, round, and reactive to light.   Cardiovascular:      Rate and Rhythm: Normal rate and regular rhythm.      Pulses: Normal pulses.      Heart sounds: Normal heart sounds.   Pulmonary:      Effort: Pulmonary effort is normal. No respiratory distress.      Breath sounds: Wheezing and rales present.   Abdominal:      General: Bowel sounds are normal.      Palpations: Abdomen is soft. There is mass.      Hernia: A hernia is present.      Comments: Large left-sided inguinal hernia present with tenderness to palpation   Musculoskeletal:         General: No swelling. Normal range of motion.      Cervical back: Normal range of motion and neck supple.      Right lower leg: No edema.      Left lower leg: No edema.   Skin:     General: Skin is warm and dry.   Neurological:      General: No focal deficit present.      Mental Status: He is alert and oriented to person, place, and time.   Psychiatric:         Mood and Affect: Mood normal.         Behavior: Behavior normal.         Current Inpatient Medications:    Current Facility-Administered Medications:     0.9%  NaCl infusion, , Intravenous, Continuous, Valerie Carias, DO, Last Rate: 50 mL/hr at 11/03/23 0824, New Bag at 11/03/23 0824    acetaminophen tablet 650 mg, 650 mg, Oral, Q8H PRN, Yovani Darling MD, 650 mg at 10/31/23 1018    acetaminophen tablet 650 mg, 650 mg, Oral, Q8H PRN, Yovani Darling MD, 650 mg at 10/31/23 0306    albuterol-ipratropium 2.5 mg-0.5 mg/3 mL nebulizer solution 3 mL, 3 mL, Nebulization, Q4H PRN, Yovani Darling MD    azithromycin 500 mg in dextrose 5 % 250 mL IVPB (ready to mix), 500 mg, Intravenous, Q24H, Yovani Darling MD, Stopped at 11/02/23 2248    HYDROcodone-acetaminophen  mg per tablet 1 tablet, 1 tablet, Oral, Q6H PRN, David Goldberg MD, 1 tablet at 11/03/23 0811    magnesium sulfate 2g in water  50mL IVPB (premix), 2 g, Intravenous, Once, Germán Benoit MD    melatonin tablet 6 mg, 6 mg, Oral, Nightly PRN, Yovani Darling MD    piperacillin-tazobactam (ZOSYN) 4.5 g in dextrose 5 % in water (D5W) 100 mL IVPB (MB+), 4.5 g, Intravenous, Q8H, Yovani aDrling MD, Last Rate: 25 mL/hr at 11/03/23 0813, 4.5 g at 11/03/23 0813    sodium chloride 0.9% flush 10 mL, 10 mL, Intravenous, PRN, Yovani Darling MD    sodium chloride 0.9% flush 10 mL, 10 mL, Intravenous, PRN, Yovani Darling MD    VTE Risk Mitigation (From admission, onward)           Ordered     IP VTE HIGH RISK PATIENT  Once         10/30/23 2050     Place sequential compression device  Until discontinued         10/30/23 2050                    Assessment:     Acute hypoxemic respiratory failure  Community-acquired pneumonia   Decompensated systolic congestive CHF   LV mural thrombus   NSTEMI/type 2 demand ischemia 2/2 above  Left groin inguinal hernia  Leukocytosis   CHF, documented LVEF less than 20%, 10/2022 s/p ICD   CAD s/p PCI 10/2022  Essential hypertension   COPD, not on home oxygen   Lung cancer s/p XRT   Colon cancer, status post colectomy    Plan:     -Heparin and Warfarin on hold secondary to acute GI bleed.  Restart pending GI recommendations.  Flex sig today for further evaluation. INR goal 2-3.   -continue carvedilol 3.125mg. Can consider restarting entresto 24/26 per home regimen for GDMT if blood pressure permits.   -continue lasix 40mg daily as tolerated  -other care per primary team       Alexia Montenegro MD  Cardiology  [facility] - 4th Floor Medical Telemetry  11/03/2023       Attending physician note  I have rounded with medical resident. I personally reviewed case related differential diagnoses, assessment and plan. A thorough physical examination noted above is performed by me. I have personally reviewed the labs, test results and medication lists that are currently available.  See above MDM  formulated by me (Spencer Pratt MD):     - overall stable.  Continue medications as above.  Add Entresto in current regimen if BP allows.  Continue diuresis.  Once okay with GI add Coumadin with INR goal 2-3.    -        All of the patient's and/or family's questions have been addressed and answered to the best of my ability.

## 2023-11-03 NOTE — PROCEDURES
Ye Vergara   MRN: [id number]   ADMISSION DATE: 10/30/2023  : 1960  AGE: 63 y.o.    PROCEDURE:  Flexible sigmoidoscopy with biopsy (attempted)    DATE OF PROCEDURE:  2023     SURGEON: NESTOR OLGUIN    PREOPERATIVE DIAGNOSIS:   1. History of rectal bleed  2. History of diversion colitis  3. Anemia   4. Abnormal CT scan    POSTOPERATIVE DIAGNOSIS:  1. Mild-to-moderate proctitis with large blood clots.  Extremely limited view.  Biopsies obtained.  Limited evaluation because of large blood clots and suboptimal prep      HISTORY AND PHYSICAL:  As per preoperative note.      DESCRIPTION OF PROCEDURE:  Informed consent was obtained.  Patient was placed in left lateral position.  Sedation per anesthesia service.  Rectal exam was unremarkable.  Olympus video gastroscope was introduced into the rectum.  Quality of the preparation was poor along with large blood clots.  Patient tolerated the procedure well without any complications.    FINDINGS:  Patient was noted to have significant amount of blood clots.  Preparation was extremely poor.  Rectal mucosa was partly visualized.  Biopsies were obtained.   Scope could not be advanced more than 10 cm as there was significant amount of blood clots with stool noted.  Minimum CO2 insufflation was done.  Biopsies were obtained from ectal mucosa.  Regular forcep was used.  Extreme caution was taken while taking biopsy.    ESTIMATED BLOOD LOSS:  2-3 cc.        COMPLICATIONS:  None    RECOMMENDATIONS:  1. Follow up biopsy results  2. Monitor hemoglobin/hematocrit periodically.    3. Consider short chain fatty acids (if available) in view of probable diversion colitis.    4. I strongly feel any subsequent lower GI endoscopy should be done either by Colorectal surgery or in the presence of colorectal surgery if necessary.

## 2023-11-04 LAB
ANION GAP SERPL CALC-SCNC: 9 MEQ/L
BACTERIA BLD CULT: NORMAL
BACTERIA BLD CULT: NORMAL
BUN SERPL-MCNC: 20.4 MG/DL (ref 8.4–25.7)
CALCIUM SERPL-MCNC: 8.4 MG/DL (ref 8.8–10)
CHLORIDE SERPL-SCNC: 92 MMOL/L (ref 98–107)
CO2 SERPL-SCNC: 39 MMOL/L (ref 23–31)
CREAT SERPL-MCNC: 1.15 MG/DL (ref 0.73–1.18)
CREAT/UREA NIT SERPL: 18
ERYTHROCYTE [DISTWIDTH] IN BLOOD BY AUTOMATED COUNT: 14.8 % (ref 11.5–17)
GFR SERPLBLD CREATININE-BSD FMLA CKD-EPI: >60 MLS/MIN/1.73/M2
GLUCOSE SERPL-MCNC: 102 MG/DL (ref 82–115)
HCT VFR BLD AUTO: 41 % (ref 42–52)
HCT VFR BLD AUTO: 42.9 % (ref 42–52)
HGB BLD-MCNC: 12.9 G/DL (ref 14–18)
HGB BLD-MCNC: 13.5 G/DL (ref 14–18)
MAGNESIUM SERPL-MCNC: 1.7 MG/DL (ref 1.6–2.6)
MCH RBC QN AUTO: 29.2 PG (ref 27–31)
MCHC RBC AUTO-ENTMCNC: 31.5 G/DL (ref 33–36)
MCV RBC AUTO: 92.8 FL (ref 80–94)
NRBC BLD AUTO-RTO: 0 %
PHOSPHATE SERPL-MCNC: 3.5 MG/DL (ref 2.3–4.7)
PLATELET # BLD AUTO: 190 X10(3)/MCL (ref 130–400)
PMV BLD AUTO: 12.3 FL (ref 7.4–10.4)
POTASSIUM SERPL-SCNC: 3.9 MMOL/L (ref 3.5–5.1)
RBC # BLD AUTO: 4.42 X10(6)/MCL (ref 4.7–6.1)
SODIUM SERPL-SCNC: 140 MMOL/L (ref 136–145)
WBC # SPEC AUTO: 15.91 X10(3)/MCL (ref 4.5–11.5)

## 2023-11-04 PROCEDURE — 85014 HEMATOCRIT: CPT | Performed by: SURGERY

## 2023-11-04 PROCEDURE — 83735 ASSAY OF MAGNESIUM: CPT | Performed by: STUDENT IN AN ORGANIZED HEALTH CARE EDUCATION/TRAINING PROGRAM

## 2023-11-04 PROCEDURE — 84100 ASSAY OF PHOSPHORUS: CPT | Performed by: STUDENT IN AN ORGANIZED HEALTH CARE EDUCATION/TRAINING PROGRAM

## 2023-11-04 PROCEDURE — 25000003 PHARM REV CODE 250: Performed by: HOSPITALIST

## 2023-11-04 PROCEDURE — 85027 COMPLETE CBC AUTOMATED: CPT | Performed by: STUDENT IN AN ORGANIZED HEALTH CARE EDUCATION/TRAINING PROGRAM

## 2023-11-04 PROCEDURE — 25000003 PHARM REV CODE 250: Performed by: NURSE PRACTITIONER

## 2023-11-04 PROCEDURE — 63600175 PHARM REV CODE 636 W HCPCS: Performed by: INTERNAL MEDICINE

## 2023-11-04 PROCEDURE — 25000003 PHARM REV CODE 250: Performed by: INTERNAL MEDICINE

## 2023-11-04 PROCEDURE — 21400001 HC TELEMETRY ROOM

## 2023-11-04 PROCEDURE — 80048 BASIC METABOLIC PNL TOTAL CA: CPT | Performed by: STUDENT IN AN ORGANIZED HEALTH CARE EDUCATION/TRAINING PROGRAM

## 2023-11-04 PROCEDURE — 63600175 PHARM REV CODE 636 W HCPCS: Performed by: STUDENT IN AN ORGANIZED HEALTH CARE EDUCATION/TRAINING PROGRAM

## 2023-11-04 RX ORDER — MESALAMINE 4 G/60ML
4 SUSPENSION RECTAL NIGHTLY
Status: DISCONTINUED | OUTPATIENT
Start: 2023-11-04 | End: 2023-11-08 | Stop reason: HOSPADM

## 2023-11-04 RX ADMIN — PIPERACILLIN AND TAZOBACTAM 4.5 G: 4; .5 INJECTION, POWDER, LYOPHILIZED, FOR SOLUTION INTRAVENOUS; PARENTERAL at 12:11

## 2023-11-04 RX ADMIN — PIPERACILLIN AND TAZOBACTAM 4.5 G: 4; .5 INJECTION, POWDER, LYOPHILIZED, FOR SOLUTION INTRAVENOUS; PARENTERAL at 04:11

## 2023-11-04 RX ADMIN — HYDROCODONE BITARTRATE AND ACETAMINOPHEN 1 TABLET: 10; 325 TABLET ORAL at 04:11

## 2023-11-04 RX ADMIN — HYDROCODONE BITARTRATE AND ACETAMINOPHEN 1 TABLET: 10; 325 TABLET ORAL at 10:11

## 2023-11-04 RX ADMIN — MAGNESIUM SULFATE HEPTAHYDRATE 2 G: 40 INJECTION, SOLUTION INTRAVENOUS at 06:11

## 2023-11-04 RX ADMIN — PIPERACILLIN AND TAZOBACTAM 4.5 G: 4; .5 INJECTION, POWDER, LYOPHILIZED, FOR SOLUTION INTRAVENOUS; PARENTERAL at 08:11

## 2023-11-04 RX ADMIN — HYDROCODONE BITARTRATE AND ACETAMINOPHEN 1 TABLET: 10; 325 TABLET ORAL at 03:11

## 2023-11-04 RX ADMIN — MESALAMINE 4 G: 4 ENEMA RECTAL at 10:11

## 2023-11-04 NOTE — PROGRESS NOTES
"Gastroenterology Progress Note    Subjective/Interval History:  11-4-23  Pt with flex sig yesterday with Dr. Rapp - proctitis with large clots - unable to advance scope much  Some mild oozing of blood overnight  Labs pending  Discussed with pt will start Mesalamine enema      ROS:  12 point system reviewed and is negative except as noted in HPI     Vital Signs:  /70   Pulse 90   Temp 97.4 °F (36.3 °C) (Oral)   Resp 18   Ht 5' 8.9" (1.75 m)   Wt 73.4 kg (161 lb 13.1 oz)   SpO2 99%   BMI 23.97 kg/m²   Body mass index is 23.97 kg/m².    Physical Exam:  Constitutional:       General: He is not in acute distress.  HENT:      Head: Normocephalic.      Mouth/Throat:      Mouth: Mucous membranes are moist.      Pharynx: Oropharynx is clear.   Eyes:      Extraocular Movements: Extraocular movements intact.      Pupils: Pupils are equal, round, and reactive to light.   Cardiovascular:      Rate and Rhythm: Normal rate and regular rhythm.      Pulses: Normal pulses.      Heart sounds: Normal heart sounds. No murmur heard.  Pulmonary:      Effort: Pulmonary effort is normal. No respiratory distress.      Breath sounds: Wheezing and rhonchi present.      Comments: O2 2 L via NC  Abdominal:      General: Bowel sounds are normal. There is no distension.      Palpations: Abdomen is soft.      Tenderness: There is no abdominal tenderness. There is no guarding.      Hernia: A hernia is present.           Comments: Right Upper Quadrant Colostomy; large protruding left inguinal hernia   Musculoskeletal:         General: No swelling.   Skin:     General: Skin is warm and dry.      Coloration: Skin is not jaundiced.   Neurological:      Mental Status: He is alert and oriented to person, place, and time.      Motor: Weakness present.   Psychiatric:         Mood and Affect: Mood normal.         Behavior: Behavior normal.     Labs:  Recent Results (from the past 48 hour(s))   ABORH RETYPE    Collection Time: 11/02/23  7:36 AM "   Result Value Ref Range    ABORH Retype O POS    Lactic Acid, Plasma    Collection Time: 11/02/23  3:53 PM   Result Value Ref Range    Lactic Acid Level 1.1 0.5 - 2.2 mmol/L   Hemoglobin and Hematocrit    Collection Time: 11/02/23  6:09 PM   Result Value Ref Range    Hgb 13.8 (L) 14.0 - 18.0 g/dL    Hct 43.0 42.0 - 52.0 %   Comprehensive Metabolic Panel    Collection Time: 11/03/23  5:00 AM   Result Value Ref Range    Sodium Level 139 136 - 145 mmol/L    Potassium Level 3.6 3.5 - 5.1 mmol/L    Chloride 88 (L) 98 - 107 mmol/L    Carbon Dioxide 41 (HH) 23 - 31 mmol/L    Glucose Level 79 (L) 82 - 115 mg/dL    Blood Urea Nitrogen 24.7 8.4 - 25.7 mg/dL    Creatinine 1.27 (H) 0.73 - 1.18 mg/dL    Calcium Level Total 8.4 (L) 8.8 - 10.0 mg/dL    Protein Total 5.7 (L) 5.8 - 7.6 gm/dL    Albumin Level 2.8 (L) 3.4 - 4.8 g/dL    Globulin 2.9 2.4 - 3.5 gm/dL    Albumin/Globulin Ratio 1.0 (L) 1.1 - 2.0 ratio    Bilirubin Total 1.4 <=1.5 mg/dL    Alkaline Phosphatase 133 40 - 150 unit/L    Alanine Aminotransferase 45 0 - 55 unit/L    Aspartate Aminotransferase 30 5 - 34 unit/L    eGFR >60 mls/min/1.73/m2   AFP Tumor Marker    Collection Time: 11/03/23  5:00 AM   Result Value Ref Range    Alpha Fetoprotein Level <2.00 <=8.90 ng/mL   CBC with Differential    Collection Time: 11/03/23  5:00 AM   Result Value Ref Range    WBC 19.45 (H) 4.50 - 11.50 x10(3)/mcL    RBC 4.91 4.70 - 6.10 x10(6)/mcL    Hgb 14.4 14.0 - 18.0 g/dL    Hct 46.3 42.0 - 52.0 %    MCV 94.3 (H) 80.0 - 94.0 fL    MCH 29.3 27.0 - 31.0 pg    MCHC 31.1 (L) 33.0 - 36.0 g/dL    RDW 15.0 11.5 - 17.0 %    Platelet 167 130 - 400 x10(3)/mcL    MPV 12.3 (H) 7.4 - 10.4 fL    Neut % 87.6 %    Lymph % 5.2 %    Mono % 5.1 %    Eos % 0.9 %    Basophil % 0.3 %    Lymph # 1.02 0.6 - 4.6 x10(3)/mcL    Neut # 17.03 (H) 2.1 - 9.2 x10(3)/mcL    Mono # 1.00 0.1 - 1.3 x10(3)/mcL    Eos # 0.17 0 - 0.9 x10(3)/mcL    Baso # 0.06 <=0.2 x10(3)/mcL    IG# 0.17 (H) 0 - 0.04 x10(3)/mcL    IG% 0.9  %    NRBC% 0.0 %   Protime-INR    Collection Time: 11/03/23 10:05 AM   Result Value Ref Range    PT 17.6 (H) 12.5 - 14.5 seconds    INR 1.5 (H) <=1.3   POCT glucose    Collection Time: 11/03/23  1:39 PM   Result Value Ref Range    POCT Glucose 85 70 - 110 mg/dL   Hemoglobin and Hematocrit    Collection Time: 11/03/23  5:17 PM   Result Value Ref Range    Hgb 13.2 (L) 14.0 - 18.0 g/dL    Hct 42.3 42.0 - 52.0 %   CBC Without Differential    Collection Time: 11/04/23  5:44 AM   Result Value Ref Range    WBC 15.91 (H) 4.50 - 11.50 x10(3)/mcL    RBC 4.42 (L) 4.70 - 6.10 x10(6)/mcL    Hgb 12.9 (L) 14.0 - 18.0 g/dL    Hct 41.0 (L) 42.0 - 52.0 %    MCV 92.8 80.0 - 94.0 fL    MCH 29.2 27.0 - 31.0 pg    MCHC 31.5 (L) 33.0 - 36.0 g/dL    RDW 14.8 11.5 - 17.0 %    Platelet 190 130 - 400 x10(3)/mcL    MPV 12.3 (H) 7.4 - 10.4 fL    NRBC% 0.0 %       Imaging:  CT Abdomen Pelvis With IV Contrast    Result Date: 11/2/2023  EXAMINATION: CT ABDOMEN PELVIS WITH IV CONTRAST CLINICAL HISTORY: Bowel obstruction suspected; TECHNIQUE: Low dose axial images, sagittal and coronal reformations were obtained from the lung bases to the pubic symphysis following the IV administration of 100 mL of Omnipaque 350 .  Oral contrast given.  .  Automated exposure control used. COMPARISON: None. FINDINGS: Liver demonstrates normal enhancement.  There is a nonspecific appearing hypodensity in the right hepatic lobe measuring 6 mm on image 37 series 2. Gallbladder contracted.  Possible cholelithiasis.  Biliary ductal system normal.  Pancreas normal.  Stomach, spleen, adrenal glands normal. Kidneys mild renal core cortical thinning, prominent renal cortical scarring bilaterally.  No hydronephrosis. Aorta tapers normally.  Mild moderate atherosclerotic calcification. Surgical changes consistent with partial colon resection.  There is a ascending colon colostomy in the right upper quadrant.  There appears to be a blind-ending loop of colon including the  descending, sigmoid colon with the blind in near the mid transverse colon region.  There is a region suspected severe thickening of the mid to proximal sigmoid colon wall and enhancement of the descending colon proximally.  Moderate stranding noted in the left lower quadrant adjacent to the colon.  Surgical clips noted in the pelvis bilaterally.  Thickening in the presacral space abutting the rectum wall posteriorly possible treatment related changes.  Small bowel loops normal.  No significant ascites.  No significant lymphadenopathy in the abdomen or pelvis. Left inguinal hernia containing multiple bowel loops extending nearly to the scrotum.  No definite evidence of incarceration or obstruction. Bladder and rectum otherwise unremarkable. Bones intact. Severe centrilobular emphysematous changes the lung bases.  Mild moderate pleural effusion on the right, dependent atelectasis bilaterally.  Localized segmental area of infiltrate dependent right lower lobe.     Surgical changes consistent with partial colon resection.  There appears to be a a blind-ending colon loop at the level of the mid transverse colon in the upper abdomen.  There is a localized region of suspected severe edematous wall thickening of the proximal sigmoid colon left lower quadrant with mild regional stranding which could indicate colitis or possible infiltrative mass.  Enhancement of the blind-ending descending duodenum in the left abdomen could indicate colitis as well. Surgical changes in the pelvis and likely treatment related thickening along the presacral space. Left inguinal hernia containing bowel loops without obstruction. There is a 6 mm hypodensity in the liver, nonspecific.  Malignancy not excluded. Other incidental findings as above. Agree with macario. Electronically signed by: Kaylynn Painting MD Date:    11/02/2023 Time:    09:11    Echo    Result Date: 10/31/2023    Left Ventricle: The left ventricle is severely dilated. Severe  global hypokinesis present. There is severely reduced systolic function with a visually estimated ejection fraction of 15 - 20%. There are mural thrombus located on the anterior lateral and inferior, apical inferior. Grade III diastolic dysfunction.   Right Ventricle: Systolic function is borderline low.   Left Atrium: Left atrium is moderately dilated.   Mitral Valve: There is mild mitral annular calcification present. There is moderate regurgitation.   Tricuspid Valve: There is mild to moderate regurgitation. The estimated PA systolic pressure is at least 52 mmHg.   Pulmonary Artery: There is moderate pulmonary hypertension.     X-Ray Chest PA And Lateral    Result Date: 10/30/2023  EXAMINATION XR CHEST PA AND LATERAL CLINICAL HISTORY Shortness of breath; TECHNIQUE A total of 2 images submitted of the chest. COMPARISON None available at the time of initial interpretation. FINDINGS Lines/tubes/devices: Right chest wall subcutaneous port is present, catheter terminating at the upper to mid SVC region.  Single lead left chest wall pacemaker/ICD also noted.  Multiple ECG leads overlie the chest. The cardiomediastinal silhouette and central pulmonary vasculature are unremarkable contour and size.  The trachea is midline.  Ill-defined hazy opacification of the bilateral mid and lower lung zones is appreciated, with no organized lobar consolidation identified.  Small right pleural effusion is suspected.  There is no convincing pneumothorax. There is no acute osseous or extrathoracic abnormality. IMPRESSION 1. Ill-defined bilateral mid/lower zone hazy infiltrates, developing atypical infectious process not excluded. 2. Suspected small right pleural effusion. Electronically signed by: Azeem Stanley Date:    10/30/2023 Time:    16:52         Assessment/Plan:  Endoscopy:    Last 2 years ago with MD Cobian through ostomy and rectum. Patient does not think he has had an EGD before     Assessment/Plan:  BRBPR- moderate amount  x2 episodes at least. Continues bleeding now.   Heparin gtt on hold- recommend continue holding until source of bleeding identified  Flexible Sigmoidoscopy Friday  2. Liver Lesion noted on CT- if further investigation needed, would recommend a CT triple phase for liver lesion or MRI Abdomen after acute issues addressed.               A. AST very mildly elevated; T bili 1.6; will order AFP    Flex sig yesterday with Dr. Rapp - proctitis with Clots - will start mesalamine enema - may need further prep and re-scope vs colorectal intervention    Awaiting AFP results  Transfuse as needed        Elizabeth Darling NP as scribe for Dr. ARUN Haro

## 2023-11-04 NOTE — PROGRESS NOTES
"   Acute Care Surgery   Progress Note  Admit Date: 10/30/2023  HD#5  POD#1 Day Post-Op    Subjective:   Interval history:  Underwent flexible sigmoidoscopy with GI yesterday - per report, likely colitis/proctitis picture.   Started mesalamine enema daily last night.   HGB stable this morning.     Home Meds:No current outpatient medications   Scheduled Meds:   magnesium sulfate IVPB  2 g Intravenous Once    mesalamine  4 g Rectal QHS    piperacillin-tazobactam (Zosyn) IV (PEDS and ADULTS) (extended infusion is not appropriate)  4.5 g Intravenous Q8H     Continuous Infusions:  PRN Meds:acetaminophen, acetaminophen, albuterol-ipratropium, HYDROcodone-acetaminophen, melatonin, sodium chloride 0.9%, sodium chloride 0.9%     Objective:     VITAL SIGNS: 24 HR MIN & MAX LAST   Temp  Min: 97.4 °F (36.3 °C)  Max: 98.7 °F (37.1 °C)  97.5 °F (36.4 °C)   BP  Min: 94/64  Max: 121/79  121/79    Pulse  Min: 76  Max: 107  82    Resp  Min: 18  Max: 19  18    SpO2  Min: 90 %  Max: 100 %  99 %      HT: 5' 8.9" (175 cm)  WT: 73.4 kg (161 lb 13.1 oz)  BMI: 24     Intake/output:  Intake/Output - Last 3 Shifts         11/02 0700  11/03 0659 11/03 0700  11/04 0659 11/04 0700  11/05 0659    P.O. 600 0     I.V. (mL/kg)  281.4 (3.8)     IV Piggyback  2259.4     Total Intake(mL/kg) 600 (8.2) 2540.8 (34.6)     Urine (mL/kg/hr) 320 (0.2) 300 (0.2)     Emesis/NG output 0 0     Stool 0 0     Total Output 320 300     Net +280 +2240.8            Urine Occurrence 0 x 0 x     Stool Occurrence 3 x 2 x     Emesis Occurrence 0 x 0 x             Intake/Output Summary (Last 24 hours) at 11/4/2023 0911  Last data filed at 11/4/2023 0642  Gross per 24 hour   Intake 2540.84 ml   Output 300 ml   Net 2240.84 ml             Lines/drains/airway:       Peripheral IV - Single Lumen 10/31/23 0747 20 G Left Antecubital (Active)   Site Assessment Clean;Dry;Intact 11/03/23 0400   Extremity Assessment Distal to IV No abnormal discoloration 11/01/23 1800   Line Status " Capped;Saline locked 11/03/23 0400   Dressing Status Clean;Dry;Intact 11/03/23 0400   Dressing Intervention Integrity maintained 11/03/23 0400   Number of days: 2            Peripheral IV - Single Lumen 10/31/23 1736 20 G Anterior;Right Forearm (Active)   Site Assessment Clean;Dry;Intact 11/03/23 0400   Extremity Assessment Distal to IV No abnormal discoloration 11/01/23 1800   Line Status Infusing 11/03/23 0400   Dressing Status Clean;Dry;Intact 11/03/23 0400   Dressing Intervention Integrity maintained 11/03/23 0400   Number of days: 2            Colostomy RLQ (Active)   Stomal Appliance 2 piece;Clean;Dry;Intact 11/02/23 2000   Stoma Appearance rosebud appearance 11/01/23 2000   Site Assessment Clean;Dry;Intact 11/02/23 2000   Peristomal Assessment Clean;Dry;Intact 11/02/23 2000   Accessories/Skin Care skin barrier ring 11/02/23 1629   Stoma Function flatus 11/02/23 1830   Treatment Bag change;Site care 10/30/23 1430   Number of days:        Physical examination:  Gen: NAD, AAOx3, answering questions appropriately  HEENT: MARY ELLEN  CV: RR  Resp: NWOB  Abd: S/NT/ND. Colostomy mucosa pink.  Ext: moving all extremities spontaneously and purposefully  Neuro: CN II-XII grossly intact    Labs:  Renal:  Recent Labs     11/02/23  0411 11/03/23  0500 11/04/23  0544   BUN 21.0 24.7 20.4   CREATININE 0.89 1.27* 1.15       Recent Labs     11/02/23  1553   LACTIC 1.1       FENGI:  Recent Labs     11/02/23  0411 11/03/23  0500 11/04/23  0544   * 139 140   K 3.3* 3.6 3.9   CO2 33* 41* 39*   CALCIUM 7.8* 8.4* 8.4*   MG 1.50*  --  1.70   PHOS  --   --  3.5   ALBUMIN 2.7* 2.8*  --    BILITOT 1.6* 1.4  --    AST 37* 30  --    ALKPHOS 144 133  --    ALT 55 45  --        Heme:  Recent Labs     11/02/23  0411 11/02/23  1809 11/03/23  0500 11/03/23  1005 11/03/23  1717 11/04/23  0544   HGB 15.3 13.8* 14.4  --  13.2* 12.9*   HCT 47.7 43.0 46.3  --  42.3 41.0*     --  167  --   --  190   INR 1.3  --   --  1.5*  --   --   "      ID:  Recent Labs     11/02/23  0411 11/03/23  0500 11/04/23  0544   WBC 25.39* 19.45* 15.91*       CBG:  Recent Labs     11/02/23  0411 11/03/23  0500 11/04/23  0544   GLUCOSE 91 79* 102        Cardiovascular:  Recent Labs   Lab 10/30/23  1514 10/30/23  2043 10/31/23  0244   TROPONINI 0.085* 0.047* 0.046*   BNP 3,544.2*  --   --        ABG:  No results for input(s): "PH", "PO2", "PCO2", "HCO3", "BE" in the last 168 hours.   I have reviewed all pertinent lab results within the past 24 hours.    Imaging:  CT Abdomen Pelvis With IV Contrast   Final Result      Surgical changes consistent with partial colon resection.  There appears to be a a blind-ending colon loop at the level of the mid transverse colon in the upper abdomen.  There is a localized region of suspected severe edematous wall thickening of the proximal sigmoid colon left lower quadrant with mild regional stranding which could indicate colitis or possible infiltrative mass.  Enhancement of the blind-ending descending duodenum in the left abdomen could indicate colitis as well.      Surgical changes in the pelvis and likely treatment related thickening along the presacral space.      Left inguinal hernia containing bowel loops without obstruction.      There is a 6 mm hypodensity in the liver, nonspecific.  Malignancy not excluded.      Other incidental findings as above.      Agree with macario.         Electronically signed by: Kaylynn Painting MD   Date:    11/02/2023   Time:    09:11      X-Ray Chest PA And Lateral   Final Result         I have reviewed all pertinent imaging results/findings within the past 24 hours.    Micro/Path/Other:  Microbiology Results (last 7 days)       Procedure Component Value Units Date/Time    Blood Culture [[id number]]  (Normal) Collected: 10/30/23 1514    Order Status: Completed Specimen: Blood Updated: 11/03/23 1601     CULTURE, BLOOD ([facility]) No Growth At 96 Hours    Blood Culture [[id number]]  (Normal) Collected: " 10/30/23 1514    Order Status: Completed Specimen: Blood Updated: 11/03/23 1601     CULTURE, BLOOD ([facility]) No Growth At 96 Hours    Respiratory Culture [[id number]]  (Abnormal) Collected: 10/31/23 0626    Order Status: Completed Specimen: Sputum, Expectorated Updated: 11/02/23 0754     Respiratory Culture Moderate Yeast     Comment: with normal respiratory lashell        GRAM STAIN Quality 2+      Few Gram positive cocci      Few Gram Positive Rods      Few Yeast           Specimen (168h ago, onward)       Start     Ordered    11/03/23 1538  Specimen to Pathology  RELEASE UPON ORDERING        References:    Click here for ordering Quick Tip   Question:  Release to patient  Answer:  Immediate    11/03/23 1538                     Assessment & Plan:   62 yo M with PMH of CHF (EF<20%) s/p AICD, CAD s/p PCI, HTN, COPD, Lung cancer s/p radiation, colon cancer s/p transverse colectomy c/b perforation? S/p R sided colostomy with long brice's, questionable colovesical fistula. Also has LV thrombus, on coumadin.    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 H/H's remain stable.   - in addition, patient is a very high risk surgical candidate, and would recommend continued medical management.   - surgery will sign off at this time; please call with questions or concerns    Catia Gannon MD   [employer] General Surgery, PGY-2

## 2023-11-04 NOTE — PLAN OF CARE
Problem: Adult Inpatient Plan of Care  Goal: Plan of Care Review  Outcome: Ongoing, Progressing  Goal: Patient-Specific Goal (Individualized)  Outcome: Ongoing, Progressing  Goal: Absence of Hospital-Acquired Illness or Injury  Outcome: Ongoing, Progressing  Goal: Optimal Comfort and Wellbeing  Outcome: Ongoing, Progressing  Goal: Readiness for Transition of Care  Outcome: Ongoing, Progressing     Problem: Fluid Imbalance (Pneumonia)  Goal: Fluid Balance  Outcome: Ongoing, Progressing     Problem: Infection (Pneumonia)  Goal: Resolution of Infection Signs and Symptoms  Outcome: Ongoing, Progressing     Problem: Respiratory Compromise (Pneumonia)  Goal: Effective Oxygenation and Ventilation  Outcome: Ongoing, Progressing     Problem: Fall Injury Risk  Goal: Absence of Fall and Fall-Related Injury  Outcome: Ongoing, Progressing     Problem: Impaired Wound Healing  Goal: Optimal Wound Healing  Outcome: Ongoing, Progressing

## 2023-11-04 NOTE — PROGRESS NOTES
[facility] Hospital Medicine Progress Note        Chief Complaint: Inpatient Follow-up pneumonia    HPI:   63-year-old male with a history of CHF with EF less than 20% status post ICD, CAD status post PCI 2022, essential hypertension, COPD, lung cancer status post XRT, colon cancer status post colectomy who was recently admitted at [facility] for sepsis from community-acquired pneumonia but reported left 2 days ago against medical advice.  He presents to the ER tonight complaining of persistent dyspnea, cough with purulent green mucus production.  He also complains of left groin pain, has a chronic left groin hernia, but states it easily reducible and he has been told he is not a surgical candidate due to his comorbidities.     He arrived afebrile but tachycardic and mildly tachypneic, hemodynamically stable and requiring 4 L nasal cannula to maintain adequate sats.  Laboratory work showed leukocytosis of 15 K, mild transaminitis, a BNP of 3500 and a mildly elevated troponin of 0.085.  Influenza and COVID testing was negative.  Chest x-ray showed ill-defined bilateral mid/lower zone hazy infiltrates concerning for developing atypical process.  Echocardiogram revealed LV thrombus.  Cardiology started on heparin drip with Coumadin.  Will need lifelong Coumadin and follow up.    CT of the abdomen and pelvis demonstrated a blind ending:  Low level of mid transverse colon, localized severe edema does wall thickening of the proximal sigmoid colon left lower quadrant with mild regional standing, possible colitis, possible infiltrative mass as well as enhancement of the blind ending descending duodenum and left abdomen, possible colitis as well.  He was started on antibiotics for possible colitis.    Continued to have rectal bleeding Coumadin and heparin drip was held.  However hemoglobin remained stable.  Scheduled for a flex sig on 11/3 with GI.  General surgery was consulted  however no indication for surgery at this time.      Interval Hx:  Patient seen and examined by bedside.  Continues to have some lower abdominal pain however mild.  Enema followed by a large bloody bowel movement.  Hemoglobin slightly dropped.  No acute events overnight.     Objective/physical exam:  General: In no acute distress, afebrile  Chest: Clear to auscultation bilaterally; on oxygen 2 L via nasal cannula  Heart: RRR, +S1, S2, no appreciable murmur  Abdomen: Soft, nontender, BS + hernia present  MSK: Warm, no lower extremity edema, no clubbing or cyanosis  Neurologic: Alert and oriented x4, Cranial nerve II-XII intact, Strength 5/5 in all 4 extremities     VITAL SIGNS: 24 HRS MIN & MAX LAST   Temp  Min: 97.4 °F (36.3 °C)  Max: 98.7 °F (37.1 °C) 97.4 °F (36.3 °C)   BP  Min: 94/64  Max: 121/79 116/77   Pulse  Min: 82  Max: 107  94   Resp  Min: 18  Max: 18 18   SpO2  Min: 90 %  Max: 100 % 100 %       Recent Labs   Lab 11/02/23 0411 11/02/23  1809 11/03/23  0500 11/03/23  1717 11/04/23  0544   WBC 25.39*  --  19.45*  --  15.91*   RBC 5.19  --  4.91  --  4.42*   HGB 15.3   < > 14.4 13.2* 12.9*   HCT 47.7   < > 46.3 42.3 41.0*   MCV 91.9  --  94.3*  --  92.8   MCH 29.5  --  29.3  --  29.2   MCHC 32.1*  --  31.1*  --  31.5*   RDW 14.9  --  15.0  --  14.8     --  167  --  190   MPV 12.6*  --  12.3*  --  12.3*    < > = values in this interval not displayed.         Recent Labs   Lab 10/31/23  0244 11/01/23 0419 11/02/23  0411 11/03/23  0500 11/04/23  0544    139 134* 139 140   K 4.3 3.4* 3.3* 3.6 3.9   CO2 28 27 33* 41* 39*   BUN 33.1* 26.7* 21.0 24.7 20.4   CREATININE 1.10 0.87 0.89 1.27* 1.15   CALCIUM 8.6* 7.9* 7.8* 8.4* 8.4*   MG  --  1.30* 1.50*  --  1.70   ALBUMIN 2.8*  --  2.7* 2.8*  --    ALKPHOS 114  --  144 133  --    ALT 99*  --  55 45  --    AST 62*  --  37* 30  --    BILITOT 1.3  --  1.6* 1.4  --             Microbiology Results (last 7 days)       Procedure Component Value Units Date/Time     Blood Culture [[id number]]  (Normal) Collected: 10/30/23 1514    Order Status: Completed Specimen: Blood Updated: 11/03/23 1601     CULTURE, BLOOD ([facility]) No Growth At 96 Hours    Blood Culture [[id number]]  (Normal) Collected: 10/30/23 1514    Order Status: Completed Specimen: Blood Updated: 11/03/23 1601     CULTURE, BLOOD ([facility]) No Growth At 96 Hours    Respiratory Culture [[id number]]  (Abnormal) Collected: 10/31/23 0626    Order Status: Completed Specimen: Sputum, Expectorated Updated: 11/02/23 0754     Respiratory Culture Moderate Yeast     Comment: with normal respiratory lashell        GRAM STAIN Quality 2+      Few Gram positive cocci      Few Gram Positive Rods      Few Yeast             Radiology:  CT Abdomen Pelvis With IV Contrast  Narrative: EXAMINATION:  CT ABDOMEN PELVIS WITH IV CONTRAST    CLINICAL HISTORY:  Bowel obstruction suspected;    TECHNIQUE:  Low dose axial images, sagittal and coronal reformations were obtained from the lung bases to the pubic symphysis following the IV administration of 100 mL of Omnipaque 350 .  Oral contrast given.  .  Automated exposure control used.    COMPARISON:  None.    FINDINGS:  Liver demonstrates normal enhancement.  There is a nonspecific appearing hypodensity in the right hepatic lobe measuring 6 mm on image 37 series 2.    Gallbladder contracted.  Possible cholelithiasis.  Biliary ductal system normal.  Pancreas normal.  Stomach, spleen, adrenal glands normal.    Kidneys mild renal core cortical thinning, prominent renal cortical scarring bilaterally.  No hydronephrosis.    Aorta tapers normally.  Mild moderate atherosclerotic calcification.    Surgical changes consistent with partial colon resection.  There is a ascending colon colostomy in the right upper quadrant.  There appears to be a blind-ending loop of colon including the descending, sigmoid colon with the blind in near the mid transverse colon region.  There is a region suspected severe  thickening of the mid to proximal sigmoid colon wall and enhancement of the descending colon proximally.  Moderate stranding noted in the left lower quadrant adjacent to the colon.  Surgical clips noted in the pelvis bilaterally.  Thickening in the presacral space abutting the rectum wall posteriorly possible treatment related changes.  Small bowel loops normal.  No significant ascites.  No significant lymphadenopathy in the abdomen or pelvis.    Left inguinal hernia containing multiple bowel loops extending nearly to the scrotum.  No definite evidence of incarceration or obstruction.    Bladder and rectum otherwise unremarkable.    Bones intact.    Severe centrilobular emphysematous changes the lung bases.  Mild moderate pleural effusion on the right, dependent atelectasis bilaterally.  Localized segmental area of infiltrate dependent right lower lobe.  Impression: Surgical changes consistent with partial colon resection.  There appears to be a a blind-ending colon loop at the level of the mid transverse colon in the upper abdomen.  There is a localized region of suspected severe edematous wall thickening of the proximal sigmoid colon left lower quadrant with mild regional stranding which could indicate colitis or possible infiltrative mass.  Enhancement of the blind-ending descending duodenum in the left abdomen could indicate colitis as well.    Surgical changes in the pelvis and likely treatment related thickening along the presacral space.    Left inguinal hernia containing bowel loops without obstruction.    There is a 6 mm hypodensity in the liver, nonspecific.  Malignancy not excluded.    Other incidental findings as above.    Agree with nighthawk.    Electronically signed by: Kaylynn Painting MD  Date:    11/02/2023  Time:    09:11      Scheduled Med:   magnesium sulfate IVPB  2 g Intravenous Once    mesalamine  4 g Rectal QHS    piperacillin-tazobactam (Zosyn) IV (PEDS and ADULTS) (extended infusion is not  appropriate)  4.5 g Intravenous Q8H          PRN Meds:  acetaminophen, acetaminophen, albuterol-ipratropium, HYDROcodone-acetaminophen, melatonin, sodium chloride 0.9%, sodium chloride 0.9%       Assessment/Plan:   Multifactorial acute hypoxemic respiratory failure  Community-acquired pneumonia contributing to above-respiratory culture growing few Gram-positive cocci and few Gram-positive rods.  Patient on Zosyn at this time; day 6, we will likely discontinue tomorrow.  HFrEF, systolic  BRBPR:  Status post flex sig done on 11/03, likely secondary to diversion colitis.  General surgery consulted who suggest patient is a very high risk surgical candidate and would recommend continued medical management.  LV thrombus- discovered on echo  ARF/dehydration- likely secondary to dehydration and possible ATN due to lower blood pressures; resolved  Leukocytosis-reactive secondary to above  NSTEMI/type 2 demand ischemia 2/2 above  Left groin inguinal hernia- general surgery consulted and recommend no surgery since non-obstructive and poor surgical candidate  CHF, documented LVEF less than 20%, 10/2022 s/p ICD   CAD s/p PCI 10/2022  Essential hypertension   COPD, not on home oxygen   Lung cancer  Colon cancer, status post colectomy    -Rectal bleeding in a patient who has colostomy,   -CT shows colitis, 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  - continue Zosyn at this time for the colitis  -patient receiving last dose of azithromycin today for pneumonia,  -continue to hold heparin and Coumadin at this time due to rectal bleeding  -monitor hemoglobin every 12 hours  -labs in a.m.  -if continued  bleeding present, can consider consulting IR for embolization  -On zosyn day 6, will consider deescalating abx tomorrow    Valerie Carias DO  Department of Hospital Medicine  [facility]  11/04/2023       All diagnosis and differential diagnosis have been reviewed; assessment and plan has been documented; I have personally reviewed the labs and test results that are presently available; I have reviewed the patients medication list; I have reviewed the consulting providers response and recommendations. I have reviewed or attempted to review medical records based upon their availability    All of the patient's questions have been  addressed and answered. Patient's is agreeable to the above stated plan. I will continue to monitor closely and make adjustments to medical management as needed.  _____________________________________________________________________

## 2023-11-05 LAB
HCT VFR BLD AUTO: 40.9 % (ref 42–52)
HCT VFR BLD AUTO: 41.2 % (ref 42–52)
HGB BLD-MCNC: 12.8 G/DL (ref 14–18)
HGB BLD-MCNC: 12.8 G/DL (ref 14–18)

## 2023-11-05 PROCEDURE — 63600175 PHARM REV CODE 636 W HCPCS: Performed by: INTERNAL MEDICINE

## 2023-11-05 PROCEDURE — 25000003 PHARM REV CODE 250: Performed by: HOSPITALIST

## 2023-11-05 PROCEDURE — 25000003 PHARM REV CODE 250: Performed by: INTERNAL MEDICINE

## 2023-11-05 PROCEDURE — 25000003 PHARM REV CODE 250: Performed by: NURSE PRACTITIONER

## 2023-11-05 PROCEDURE — 85014 HEMATOCRIT: CPT | Performed by: SURGERY

## 2023-11-05 PROCEDURE — 21400001 HC TELEMETRY ROOM

## 2023-11-05 RX ADMIN — PIPERACILLIN AND TAZOBACTAM 4.5 G: 4; .5 INJECTION, POWDER, LYOPHILIZED, FOR SOLUTION INTRAVENOUS; PARENTERAL at 12:11

## 2023-11-05 RX ADMIN — MESALAMINE 4 G: 4 ENEMA RECTAL at 09:11

## 2023-11-05 RX ADMIN — HYDROCODONE BITARTRATE AND ACETAMINOPHEN 1 TABLET: 10; 325 TABLET ORAL at 09:11

## 2023-11-05 NOTE — PROGRESS NOTES
[facility] Hospital Medicine Progress Note        Chief Complaint: Inpatient Follow-up pneumonia    HPI:   63-year-old male with a history of CHF with EF less than 20% status post ICD, CAD status post PCI 2022, essential hypertension, COPD, lung cancer status post XRT, colon cancer status post colectomy who was recently admitted at [facility] for sepsis from community-acquired pneumonia but reported left 2 days ago against medical advice.  He presents to the ER tonight complaining of persistent dyspnea, cough with purulent green mucus production.  He also complains of left groin pain, has a chronic left groin hernia, but states it easily reducible and he has been told he is not a surgical candidate due to his comorbidities.     He arrived afebrile but tachycardic and mildly tachypneic, hemodynamically stable and requiring 4 L nasal cannula to maintain adequate sats.  Laboratory work showed leukocytosis of 15 K, mild transaminitis, a BNP of 3500 and a mildly elevated troponin of 0.085.  Influenza and COVID testing was negative.  Chest x-ray showed ill-defined bilateral mid/lower zone hazy infiltrates concerning for developing atypical process.  Echocardiogram revealed LV thrombus.  Cardiology started on heparin drip with Coumadin.  Will need lifelong Coumadin and follow up.    CT of the abdomen and pelvis demonstrated a blind ending:  Low level of mid transverse colon, localized severe edema does wall thickening of the proximal sigmoid colon left lower quadrant with mild regional standing, possible colitis, possible infiltrative mass as well as enhancement of the blind ending descending duodenum and left abdomen, possible colitis as well.  He was started on antibiotics for possible colitis.    Continued to have rectal bleeding Coumadin and heparin drip was held.  However hemoglobin remained stable.  Scheduled for a flex sig on 11/3 with GI.  General surgery was consulted  however no indication for surgery at this time.  Patient was started on mesalamine enemas per GI for diversion colitis      Interval Hx:  Patient seen and examined by bedside.  Doing well.  No current acute concerns at this time.  Getting mesalamine enemas or diversion colitis.  Denies any further bloody bowel movements since yesterday.  Hemoglobin remaining stable    Objective/physical exam:  General: In no acute distress, afebrile  Chest: Clear to auscultation bilaterally; on oxygen 2 L via nasal cannula  Heart: RRR, +S1, S2, no appreciable murmur  Abdomen: Soft, nontender, BS + hernia present  MSK: Warm, no lower extremity edema, no clubbing or cyanosis  Neurologic: Alert and oriented x4, Cranial nerve II-XII intact, Strength 5/5 in all 4 extremities     VITAL SIGNS: 24 HRS MIN & MAX LAST   Temp  Min: 97.4 °F (36.3 °C)  Max: 99.4 °F (37.4 °C) 99.4 °F (37.4 °C)   BP  Min: 106/66  Max: 131/82 117/70   Pulse  Min: 90  Max: 98  96   Resp  Min: 18  Max: 20 18   SpO2  Min: 92 %  Max: 100 % 98 %       Recent Labs   Lab 11/02/23  0411 11/02/23  1809 11/03/23  0500 11/03/23  1717 11/04/23  0544 11/04/23  1714 11/05/23  0509   WBC 25.39*  --  19.45*  --  15.91*  --   --    RBC 5.19  --  4.91  --  4.42*  --   --    HGB 15.3   < > 14.4   < > 12.9* 13.5* 12.8*   HCT 47.7   < > 46.3   < > 41.0* 42.9 41.2*   MCV 91.9  --  94.3*  --  92.8  --   --    MCH 29.5  --  29.3  --  29.2  --   --    MCHC 32.1*  --  31.1*  --  31.5*  --   --    RDW 14.9  --  15.0  --  14.8  --   --      --  167  --  190  --   --    MPV 12.6*  --  12.3*  --  12.3*  --   --     < > = values in this interval not displayed.         Recent Labs   Lab 10/31/23  0244 11/01/23  0419 11/02/23 0411 11/03/23  0500 11/04/23  0544    139 134* 139 140   K 4.3 3.4* 3.3* 3.6 3.9   CO2 28 27 33* 41* 39*   BUN 33.1* 26.7* 21.0 24.7 20.4   CREATININE 1.10 0.87 0.89 1.27* 1.15   CALCIUM 8.6* 7.9* 7.8* 8.4* 8.4*   MG  --  1.30* 1.50*  --  1.70   ALBUMIN 2.8*  --   2.7* 2.8*  --    ALKPHOS 114  --  144 133  --    ALT 99*  --  55 45  --    AST 62*  --  37* 30  --    BILITOT 1.3  --  1.6* 1.4  --             Microbiology Results (last 7 days)       Procedure Component Value Units Date/Time    Blood Culture [[id number]]  (Normal) Collected: 10/30/23 1514    Order Status: Completed Specimen: Blood Updated: 11/04/23 1601     CULTURE, BLOOD ([facility]) No Growth at 5 days    Blood Culture [[id number]]  (Normal) Collected: 10/30/23 1514    Order Status: Completed Specimen: Blood Updated: 11/04/23 1601     CULTURE, BLOOD ([facility]) No Growth at 5 days    Respiratory Culture [[id number]]  (Abnormal) Collected: 10/31/23 0626    Order Status: Completed Specimen: Sputum, Expectorated Updated: 11/02/23 0754     Respiratory Culture Moderate Yeast     Comment: with normal respiratory lashell        GRAM STAIN Quality 2+      Few Gram positive cocci      Few Gram Positive Rods      Few Yeast             Radiology:  CT Abdomen Pelvis With IV Contrast  Narrative: EXAMINATION:  CT ABDOMEN PELVIS WITH IV CONTRAST    CLINICAL HISTORY:  Bowel obstruction suspected;    TECHNIQUE:  Low dose axial images, sagittal and coronal reformations were obtained from the lung bases to the pubic symphysis following the IV administration of 100 mL of Omnipaque 350 .  Oral contrast given.  .  Automated exposure control used.    COMPARISON:  None.    FINDINGS:  Liver demonstrates normal enhancement.  There is a nonspecific appearing hypodensity in the right hepatic lobe measuring 6 mm on image 37 series 2.    Gallbladder contracted.  Possible cholelithiasis.  Biliary ductal system normal.  Pancreas normal.  Stomach, spleen, adrenal glands normal.    Kidneys mild renal core cortical thinning, prominent renal cortical scarring bilaterally.  No hydronephrosis.    Aorta tapers normally.  Mild moderate atherosclerotic calcification.    Surgical changes consistent with partial colon resection.  There is a ascending colon  colostomy in the right upper quadrant.  There appears to be a blind-ending loop of colon including the descending, sigmoid colon with the blind in near the mid transverse colon region.  There is a region suspected severe thickening of the mid to proximal sigmoid colon wall and enhancement of the descending colon proximally.  Moderate stranding noted in the left lower quadrant adjacent to the colon.  Surgical clips noted in the pelvis bilaterally.  Thickening in the presacral space abutting the rectum wall posteriorly possible treatment related changes.  Small bowel loops normal.  No significant ascites.  No significant lymphadenopathy in the abdomen or pelvis.    Left inguinal hernia containing multiple bowel loops extending nearly to the scrotum.  No definite evidence of incarceration or obstruction.    Bladder and rectum otherwise unremarkable.    Bones intact.    Severe centrilobular emphysematous changes the lung bases.  Mild moderate pleural effusion on the right, dependent atelectasis bilaterally.  Localized segmental area of infiltrate dependent right lower lobe.  Impression: Surgical changes consistent with partial colon resection.  There appears to be a a blind-ending colon loop at the level of the mid transverse colon in the upper abdomen.  There is a localized region of suspected severe edematous wall thickening of the proximal sigmoid colon left lower quadrant with mild regional stranding which could indicate colitis or possible infiltrative mass.  Enhancement of the blind-ending descending duodenum in the left abdomen could indicate colitis as well.    Surgical changes in the pelvis and likely treatment related thickening along the presacral space.    Left inguinal hernia containing bowel loops without obstruction.    There is a 6 mm hypodensity in the liver, nonspecific.  Malignancy not excluded.    Other incidental findings as above.    Agree with nighthawk.    Electronically signed by: Kaylynn   MD Mert  Date:    11/02/2023  Time:    09:11      Scheduled Med:   mesalamine  4 g Rectal QHS    piperacillin-tazobactam (Zosyn) IV (PEDS and ADULTS) (extended infusion is not appropriate)  4.5 g Intravenous Q8H          PRN Meds:  acetaminophen, acetaminophen, albuterol-ipratropium, HYDROcodone-acetaminophen, melatonin, sodium chloride 0.9%, sodium chloride 0.9%       Assessment/Plan:   Multifactorial acute hypoxemic respiratory failure  Community-acquired pneumonia contributing to above-respiratory culture growing few Gram-positive cocci and few Gram-positive rods.  Patient on Zosyn at this time; day 6, we will likely discontinue tomorrow.  HFrEF, systolic  BRBPR:  Status post flex sig done on 11/03, likely secondary to diversion colitis.  General surgery consulted who suggest patient is a very high risk surgical candidate and would recommend continued medical management.  LV thrombus- discovered on echo  ARF/dehydration- likely secondary to dehydration and possible ATN due to lower blood pressures; resolved  Leukocytosis-reactive secondary to above  Nonspecific 6 mm hypodensity in the liver:  AFP normal; liver enzymes normal; need further workup with possible imaging studies outpatient to monitor  NSTEMI/type 2 demand ischemia 2/2 above  Left groin inguinal hernia- general surgery consulted and recommend no surgery since non-obstructive and poor surgical candidate  CHF, documented LVEF less than 20%, 10/2022 s/p ICD   CAD s/p PCI 10/2022  Essential hypertension   COPD, not on home oxygen   Lung cancer  Colon cancer, status post colectomy    -Rectal bleeding in a patient who has colostomy,   -CT shows colitis, 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  No further indication at this time  -patient also finish course of azithromycin for pneumonia  -continue to hold heparin and Coumadin at this time due to rectal bleeding  -monitor hemoglobin every 12 hours  -labs in a.m.      Valerie Carias DO  Department of Hospital Medicine  [facility]  11/05/2023       All diagnosis and differential diagnosis have been reviewed; assessment and plan has been documented; I have personally reviewed the labs and test results that are presently available; I have reviewed the patients medication list; I have reviewed the consulting providers response and recommendations. I have reviewed or attempted to review medical records based upon their availability    All of the patient's questions have been  addressed and answered. Patient's is agreeable to the above stated plan. I will continue to monitor closely and make adjustments to medical management as needed.  _____________________________________________________________________

## 2023-11-05 NOTE — PROGRESS NOTES
"Gastroenterology Progress Note    Subjective/Interval History:  11-4-23  Pt with flex sig yesterday with Dr. Rapp - proctitis with large clots - unable to advance scope much  Some mild oozing of blood overnight  Labs pending  Discussed with pt will start Mesalamine enema    11-5-23  Pt with no bleeding overnight - per pt and CNA  Hgb pending   Less rectal pain  Doing well with Mesalamine enema      ROS:  12 point system reviewed and is negative except as noted in HPI     Vital Signs:  /88   Pulse 98   Temp 97.4 °F (36.3 °C) (Oral)   Resp 20   Ht 5' 8.9" (1.75 m)   Wt 73.4 kg (161 lb 13.1 oz)   SpO2 100%   BMI 23.97 kg/m²   Body mass index is 23.97 kg/m².    Physical Exam:  Constitutional:       General: He is not in acute distress.  HENT:      Head: Normocephalic.      Mouth/Throat:      Mouth: Mucous membranes are moist.      Pharynx: Oropharynx is clear.   Eyes:      Extraocular Movements: Extraocular movements intact.      Pupils: Pupils are equal, round, and reactive to light.   Cardiovascular:      Rate and Rhythm: Normal rate and regular rhythm.      Pulses: Normal pulses.      Heart sounds: Normal heart sounds. No murmur heard.  Pulmonary:      Effort: Pulmonary effort is normal. No respiratory distress.      Breath sounds: Wheezing and rhonchi present.      Comments: O2 2 L via NC  Abdominal:      General: Bowel sounds are normal. There is no distension.      Palpations: Abdomen is soft.      Tenderness: There is no abdominal tenderness. There is no guarding.      Hernia: A hernia is present.           Comments: Right Upper Quadrant Colostomy; large protruding left inguinal hernia   Musculoskeletal:         General: No swelling.   Skin:     General: Skin is warm and dry.      Coloration: Skin is not jaundiced.   Neurological:      Mental Status: He is alert and oriented to person, place, and time.      Motor: Weakness present.   Psychiatric:         Mood and Affect: Mood normal.         " Behavior: Behavior normal.     Labs:  Recent Results (from the past 48 hour(s))   Protime-INR    Collection Time: 11/03/23 10:05 AM   Result Value Ref Range    PT 17.6 (H) 12.5 - 14.5 seconds    INR 1.5 (H) <=1.3   POCT glucose    Collection Time: 11/03/23  1:39 PM   Result Value Ref Range    POCT Glucose 85 70 - 110 mg/dL   Hemoglobin and Hematocrit    Collection Time: 11/03/23  5:17 PM   Result Value Ref Range    Hgb 13.2 (L) 14.0 - 18.0 g/dL    Hct 42.3 42.0 - 52.0 %   CBC Without Differential    Collection Time: 11/04/23  5:44 AM   Result Value Ref Range    WBC 15.91 (H) 4.50 - 11.50 x10(3)/mcL    RBC 4.42 (L) 4.70 - 6.10 x10(6)/mcL    Hgb 12.9 (L) 14.0 - 18.0 g/dL    Hct 41.0 (L) 42.0 - 52.0 %    MCV 92.8 80.0 - 94.0 fL    MCH 29.2 27.0 - 31.0 pg    MCHC 31.5 (L) 33.0 - 36.0 g/dL    RDW 14.8 11.5 - 17.0 %    Platelet 190 130 - 400 x10(3)/mcL    MPV 12.3 (H) 7.4 - 10.4 fL    NRBC% 0.0 %   Basic Metabolic Panel    Collection Time: 11/04/23  5:44 AM   Result Value Ref Range    Sodium Level 140 136 - 145 mmol/L    Potassium Level 3.9 3.5 - 5.1 mmol/L    Chloride 92 (L) 98 - 107 mmol/L    Carbon Dioxide 39 (H) 23 - 31 mmol/L    Glucose Level 102 82 - 115 mg/dL    Blood Urea Nitrogen 20.4 8.4 - 25.7 mg/dL    Creatinine 1.15 0.73 - 1.18 mg/dL    BUN/Creatinine Ratio 18     Calcium Level Total 8.4 (L) 8.8 - 10.0 mg/dL    Anion Gap 9.0 mEq/L    eGFR >60 mls/min/1.73/m2   Magnesium    Collection Time: 11/04/23  5:44 AM   Result Value Ref Range    Magnesium Level 1.70 1.60 - 2.60 mg/dL   Phosphorus    Collection Time: 11/04/23  5:44 AM   Result Value Ref Range    Phosphorus Level 3.5 2.3 - 4.7 mg/dL   Hemoglobin and Hematocrit    Collection Time: 11/04/23  5:14 PM   Result Value Ref Range    Hgb 13.5 (L) 14.0 - 18.0 g/dL    Hct 42.9 42.0 - 52.0 %   Hemoglobin and Hematocrit    Collection Time: 11/05/23  5:09 AM   Result Value Ref Range    Hgb 12.8 (L) 14.0 - 18.0 g/dL    Hct 41.2 (L) 42.0 - 52.0 %       Imaging:  CT  Abdomen Pelvis With IV Contrast    Result Date: 11/2/2023  EXAMINATION: CT ABDOMEN PELVIS WITH IV CONTRAST CLINICAL HISTORY: Bowel obstruction suspected; TECHNIQUE: Low dose axial images, sagittal and coronal reformations were obtained from the lung bases to the pubic symphysis following the IV administration of 100 mL of Omnipaque 350 .  Oral contrast given.  .  Automated exposure control used. COMPARISON: None. FINDINGS: Liver demonstrates normal enhancement.  There is a nonspecific appearing hypodensity in the right hepatic lobe measuring 6 mm on image 37 series 2. Gallbladder contracted.  Possible cholelithiasis.  Biliary ductal system normal.  Pancreas normal.  Stomach, spleen, adrenal glands normal. Kidneys mild renal core cortical thinning, prominent renal cortical scarring bilaterally.  No hydronephrosis. Aorta tapers normally.  Mild moderate atherosclerotic calcification. Surgical changes consistent with partial colon resection.  There is a ascending colon colostomy in the right upper quadrant.  There appears to be a blind-ending loop of colon including the descending, sigmoid colon with the blind in near the mid transverse colon region.  There is a region suspected severe thickening of the mid to proximal sigmoid colon wall and enhancement of the descending colon proximally.  Moderate stranding noted in the left lower quadrant adjacent to the colon.  Surgical clips noted in the pelvis bilaterally.  Thickening in the presacral space abutting the rectum wall posteriorly possible treatment related changes.  Small bowel loops normal.  No significant ascites.  No significant lymphadenopathy in the abdomen or pelvis. Left inguinal hernia containing multiple bowel loops extending nearly to the scrotum.  No definite evidence of incarceration or obstruction. Bladder and rectum otherwise unremarkable. Bones intact. Severe centrilobular emphysematous changes the lung bases.  Mild moderate pleural effusion on  the right, dependent atelectasis bilaterally.  Localized segmental area of infiltrate dependent right lower lobe.     Surgical changes consistent with partial colon resection.  There appears to be a a blind-ending colon loop at the level of the mid transverse colon in the upper abdomen.  There is a localized region of suspected severe edematous wall thickening of the proximal sigmoid colon left lower quadrant with mild regional stranding which could indicate colitis or possible infiltrative mass.  Enhancement of the blind-ending descending duodenum in the left abdomen could indicate colitis as well. Surgical changes in the pelvis and likely treatment related thickening along the presacral space. Left inguinal hernia containing bowel loops without obstruction. There is a 6 mm hypodensity in the liver, nonspecific.  Malignancy not excluded. Other incidental findings as above. Agree with nighthawk. Electronically signed by: Kaylynn Painting MD Date:    11/02/2023 Time:    09:11    Echo    Result Date: 10/31/2023    Left Ventricle: The left ventricle is severely dilated. Severe global hypokinesis present. There is severely reduced systolic function with a visually estimated ejection fraction of 15 - 20%. There are mural thrombus located on the anterior lateral and inferior, apical inferior. Grade III diastolic dysfunction.   Right Ventricle: Systolic function is borderline low.   Left Atrium: Left atrium is moderately dilated.   Mitral Valve: There is mild mitral annular calcification present. There is moderate regurgitation.   Tricuspid Valve: There is mild to moderate regurgitation. The estimated PA systolic pressure is at least 52 mmHg.   Pulmonary Artery: There is moderate pulmonary hypertension.     X-Ray Chest PA And Lateral    Result Date: 10/30/2023  EXAMINATION XR CHEST PA AND LATERAL CLINICAL HISTORY Shortness of breath; TECHNIQUE A total of 2 images submitted of the chest. COMPARISON None available at the  time of initial interpretation. FINDINGS Lines/tubes/devices: Right chest wall subcutaneous port is present, catheter terminating at the upper to mid SVC region.  Single lead left chest wall pacemaker/ICD also noted.  Multiple ECG leads overlie the chest. The cardiomediastinal silhouette and central pulmonary vasculature are unremarkable contour and size.  The trachea is midline.  Ill-defined hazy opacification of the bilateral mid and lower lung zones is appreciated, with no organized lobar consolidation identified.  Small right pleural effusion is suspected.  There is no convincing pneumothorax. There is no acute osseous or extrathoracic abnormality. IMPRESSION 1. Ill-defined bilateral mid/lower zone hazy infiltrates, developing atypical infectious process not excluded. 2. Suspected small right pleural effusion. Electronically signed by: Azeem Stanley Date:    10/30/2023 Time:    16:52         Assessment/Plan:  Endoscopy:    Last 2 years ago with MD Cobian through ostomy and rectum. Patient does not think he has had an EGD before     Assessment/Plan:  BRBPR- moderate amount x2 episodes at least. Continues bleeding now.   Heparin gtt on hold- recommend continue holding until source of bleeding identified  Flexible Sigmoidoscopy Friday  2. Liver Lesion noted on CT- if further investigation needed, would recommend a CT triple phase for liver lesion or MRI Abdomen after acute issues addressed.               A. AST very mildly elevated; T bili 1.6; will order AFP    Doing well with mesalamine enemas    Awaiting AFP results  Transfuse as needed        Elizabeth Darling NP as scribe for Dr. ARUN Haro

## 2023-11-06 LAB
HCT VFR BLD AUTO: 40.5 % (ref 42–52)
HCT VFR BLD AUTO: 42.1 % (ref 42–52)
HGB BLD-MCNC: 12.6 G/DL (ref 14–18)
HGB BLD-MCNC: 12.6 G/DL (ref 14–18)

## 2023-11-06 PROCEDURE — 85014 HEMATOCRIT: CPT | Performed by: SURGERY

## 2023-11-06 PROCEDURE — 25000003 PHARM REV CODE 250: Performed by: NURSE PRACTITIONER

## 2023-11-06 PROCEDURE — 25000003 PHARM REV CODE 250: Performed by: INTERNAL MEDICINE

## 2023-11-06 PROCEDURE — 25000003 PHARM REV CODE 250: Performed by: HOSPITALIST

## 2023-11-06 PROCEDURE — 21400001 HC TELEMETRY ROOM

## 2023-11-06 RX ADMIN — MESALAMINE 4 G: 4 ENEMA RECTAL at 08:11

## 2023-11-06 RX ADMIN — HYDROCODONE BITARTRATE AND ACETAMINOPHEN 1 TABLET: 10; 325 TABLET ORAL at 12:11

## 2023-11-06 RX ADMIN — Medication 6 MG: at 08:11

## 2023-11-06 RX ADMIN — HYDROCODONE BITARTRATE AND ACETAMINOPHEN 1 TABLET: 10; 325 TABLET ORAL at 08:11

## 2023-11-06 NOTE — PROGRESS NOTES
[facility] Hospital Medicine Progress Note        Chief Complaint: Inpatient Follow-up pneumonia    HPI:   63-year-old male with a history of CHF with EF less than 20% status post ICD, CAD status post PCI 2022, essential hypertension, COPD, lung cancer status post XRT, colon cancer status post colectomy who was recently admitted at [facility] for sepsis from community-acquired pneumonia but reported left 2 days ago against medical advice.  He presents to the ER tonight complaining of persistent dyspnea, cough with purulent green mucus production.  He also complains of left groin pain, has a chronic left groin hernia, but states it easily reducible and he has been told he is not a surgical candidate due to his comorbidities.     He arrived afebrile but tachycardic and mildly tachypneic, hemodynamically stable and requiring 4 L nasal cannula to maintain adequate sats.  Laboratory work showed leukocytosis of 15 K, mild transaminitis, a BNP of 3500 and a mildly elevated troponin of 0.085.  Influenza and COVID testing was negative.  Chest x-ray showed ill-defined bilateral mid/lower zone hazy infiltrates concerning for developing atypical process.  Echocardiogram revealed LV thrombus.  Cardiology started on heparin drip with Coumadin.  Will need lifelong Coumadin and follow up.    CT of the abdomen and pelvis demonstrated a blind ending:  Low level of mid transverse colon, localized severe edema does wall thickening of the proximal sigmoid colon left lower quadrant with mild regional standing, possible colitis, possible infiltrative mass as well as enhancement of the blind ending descending duodenum and left abdomen, possible colitis as well.  He was started on antibiotics for possible colitis.    Continued to have rectal bleeding Coumadin and heparin drip was held.  However hemoglobin remained stable.  Scheduled for a flex sig on 11/3 with GI.  General surgery was consulted  however no indication for surgery at this time.  Patient was started on mesalamine enemas per GI for diversion colitis      Interval Hx:  Reports bloody bowel movement.  receiving mesalamine enemas for diversion colitis.  Hemoglobin remaining stable. VSS    Objective/physical exam:  General: In no acute distress, afebrile  Chest: Clear to auscultation bilaterally; on oxygen 2 L via nasal cannula  Heart: RRR, +S1, S2, no appreciable murmur  Abdomen: Soft, nontender, BS + hernia present  MSK: Warm, no lower extremity edema, no clubbing or cyanosis  Neurologic: Alert and oriented x4, Cranial nerve II-XII intact, Strength 5/5 in all 4 extremities     VITAL SIGNS: 24 HRS MIN & MAX LAST   Temp  Min: 97.4 °F (36.3 °C)  Max: 98.9 °F (37.2 °C) 97.4 °F (36.3 °C)   BP  Min: 101/67  Max: 122/76 101/67   Pulse  Min: 95  Max: 115  107   Resp  Min: 18  Max: 22 18   SpO2  Min: 94 %  Max: 100 % 99 %       Recent Labs   Lab 11/02/23  0411 11/02/23  1809 11/03/23  0500 11/03/23  1717 11/04/23  0544 11/04/23  1714 11/05/23  0509 11/05/23  1756 11/06/23  0330   WBC 25.39*  --  19.45*  --  15.91*  --   --   --   --    RBC 5.19  --  4.91  --  4.42*  --   --   --   --    HGB 15.3   < > 14.4   < > 12.9*   < > 12.8* 12.8* 12.6*   HCT 47.7   < > 46.3   < > 41.0*   < > 41.2* 40.9* 42.1   MCV 91.9  --  94.3*  --  92.8  --   --   --   --    MCH 29.5  --  29.3  --  29.2  --   --   --   --    MCHC 32.1*  --  31.1*  --  31.5*  --   --   --   --    RDW 14.9  --  15.0  --  14.8  --   --   --   --      --  167  --  190  --   --   --   --    MPV 12.6*  --  12.3*  --  12.3*  --   --   --   --     < > = values in this interval not displayed.         Recent Labs   Lab 10/31/23  0244 11/01/23  0419 11/02/23  0411 11/03/23  0500 11/04/23  0544    139 134* 139 140   K 4.3 3.4* 3.3* 3.6 3.9   CO2 28 27 33* 41* 39*   BUN 33.1* 26.7* 21.0 24.7 20.4   CREATININE 1.10 0.87 0.89 1.27* 1.15   CALCIUM 8.6* 7.9* 7.8* 8.4* 8.4*   MG  --  1.30* 1.50*  --   1.70   ALBUMIN 2.8*  --  2.7* 2.8*  --    ALKPHOS 114  --  144 133  --    ALT 99*  --  55 45  --    AST 62*  --  37* 30  --    BILITOT 1.3  --  1.6* 1.4  --             Microbiology Results (last 7 days)       Procedure Component Value Units Date/Time    Blood Culture [[id number]]  (Normal) Collected: 10/30/23 1514    Order Status: Completed Specimen: Blood Updated: 11/04/23 1601     CULTURE, BLOOD ([facility]) No Growth at 5 days    Blood Culture [[id number]]  (Normal) Collected: 10/30/23 1514    Order Status: Completed Specimen: Blood Updated: 11/04/23 1601     CULTURE, BLOOD ([facility]) No Growth at 5 days    Respiratory Culture [[id number]]  (Abnormal) Collected: 10/31/23 0626    Order Status: Completed Specimen: Sputum, Expectorated Updated: 11/02/23 0754     Respiratory Culture Moderate Yeast     Comment: with normal respiratory lashell        GRAM STAIN Quality 2+      Few Gram positive cocci      Few Gram Positive Rods      Few Yeast             Radiology:  CT Abdomen Pelvis With IV Contrast  Narrative: EXAMINATION:  CT ABDOMEN PELVIS WITH IV CONTRAST    CLINICAL HISTORY:  Bowel obstruction suspected;    TECHNIQUE:  Low dose axial images, sagittal and coronal reformations were obtained from the lung bases to the pubic symphysis following the IV administration of 100 mL of Omnipaque 350 .  Oral contrast given.  .  Automated exposure control used.    COMPARISON:  None.    FINDINGS:  Liver demonstrates normal enhancement.  There is a nonspecific appearing hypodensity in the right hepatic lobe measuring 6 mm on image 37 series 2.    Gallbladder contracted.  Possible cholelithiasis.  Biliary ductal system normal.  Pancreas normal.  Stomach, spleen, adrenal glands normal.    Kidneys mild renal core cortical thinning, prominent renal cortical scarring bilaterally.  No hydronephrosis.    Aorta tapers normally.  Mild moderate atherosclerotic calcification.    Surgical changes consistent with partial colon resection.  There  is a ascending colon colostomy in the right upper quadrant.  There appears to be a blind-ending loop of colon including the descending, sigmoid colon with the blind in near the mid transverse colon region.  There is a region suspected severe thickening of the mid to proximal sigmoid colon wall and enhancement of the descending colon proximally.  Moderate stranding noted in the left lower quadrant adjacent to the colon.  Surgical clips noted in the pelvis bilaterally.  Thickening in the presacral space abutting the rectum wall posteriorly possible treatment related changes.  Small bowel loops normal.  No significant ascites.  No significant lymphadenopathy in the abdomen or pelvis.    Left inguinal hernia containing multiple bowel loops extending nearly to the scrotum.  No definite evidence of incarceration or obstruction.    Bladder and rectum otherwise unremarkable.    Bones intact.    Severe centrilobular emphysematous changes the lung bases.  Mild moderate pleural effusion on the right, dependent atelectasis bilaterally.  Localized segmental area of infiltrate dependent right lower lobe.  Impression: Surgical changes consistent with partial colon resection.  There appears to be a a blind-ending colon loop at the level of the mid transverse colon in the upper abdomen.  There is a localized region of suspected severe edematous wall thickening of the proximal sigmoid colon left lower quadrant with mild regional stranding which could indicate colitis or possible infiltrative mass.  Enhancement of the blind-ending descending duodenum in the left abdomen could indicate colitis as well.    Surgical changes in the pelvis and likely treatment related thickening along the presacral space.    Left inguinal hernia containing bowel loops without obstruction.    There is a 6 mm hypodensity in the liver, nonspecific.  Malignancy not excluded.    Other incidental findings as above.    Agree with nighthawk.    Electronically  signed by: Kaylynn Painting MD  Date:    11/02/2023  Time:    09:11      Scheduled Med:   mesalamine  4 g Rectal QHS          PRN Meds:  acetaminophen, acetaminophen, albuterol-ipratropium, HYDROcodone-acetaminophen, melatonin, sodium chloride 0.9%, sodium chloride 0.9%       Assessment/Plan:   Multifactorial acute hypoxemic respiratory failure  Community-acquired pneumonia contributing to above-respiratory culture growing few Gram-positive cocci and few Gram-positive rods.  Finished course with Zosyn  BRBPR:  Status post flex sig done on 11/03, likely secondary to diversion colitis.  General surgery consulted who suggest patient is a very high risk surgical candidate and would recommend continued medical management.  LV thrombus- discovered on echo  ARF/dehydration- likely secondary to dehydration and possible ATN due to lower blood pressures; resolved  Leukocytosis-reactive secondary to above  Nonspecific 6 mm hypodensity in the liver:  AFP normal; liver enzymes normal; need further workup with possible imaging studies outpatient to monitor  NSTEMI/type 2 demand ischemia 2/2 above  Left groin inguinal hernia- general surgery consulted and recommend no surgery since non-obstructive and poor surgical candidate  CHF, documented LVEF less than 20%, 10/2022 s/p ICD   CAD s/p PCI 10/2022  Essential hypertension   COPD, not on home oxygen   Lung cancer  Colon cancer, status post colectomy    -Rectal bleeding in a patient who has colostomy,   -CT shows colitis, 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  No further indication at this time  -patient also finish course of  azithromycin for pneumonia  -continue to hold heparin and Coumadin at this time due to rectal bleeding; Patient has high risk LV thrombus, will need to be on Coumadin asap once bleeding controlled  -monitor hemoglobin every 12 hours  -labs in a.m.      Valerie Carias DO  Department of Hospital Medicine  [facility]  11/06/2023       All diagnosis and differential diagnosis have been reviewed; assessment and plan has been documented; I have personally reviewed the labs and test results that are presently available; I have reviewed the patients medication list; I have reviewed the consulting providers response and recommendations. I have reviewed or attempted to review medical records based upon their availability    All of the patient's questions have been  addressed and answered. Patient's is agreeable to the above stated plan. I will continue to monitor closely and make adjustments to medical management as needed.  _____________________________________________________________________

## 2023-11-06 NOTE — PROGRESS NOTES
Inpatient Nutrition Assessment    Admit Date: 10/30/2023   Total duration of encounter: 7 days   Patient Age: 63 y.o.    Nutrition Recommendation/Prescription     Continue oral diet as tolerated.  Boost Very High Calorie (provides 530 kcal, 22 g protein per serving) BID.  Encouraged intake.    Communication of Recommendations: reviewed with patient    Nutrition Assessment     Malnutrition Assessment/Nutrition-Focused Physical Exam    Malnutrition Context: acute illness or injury (11/01/23 1354)  Malnutrition Level: moderate (11/01/23 1354)  Energy Intake (Malnutrition): less than or equal to 50% for greater than or equal to 5 days (11/01/23 1354)  Weight Loss (Malnutrition):  (does not meet criteria) (11/01/23 1354)  Subcutaneous Fat (Malnutrition): moderate depletion (11/01/23 1354)  Orbital Region (Subcutaneous Fat Loss): moderate depletion  Upper Arm Region (Subcutaneous Fat Loss): moderate depletion     Muscle Mass (Malnutrition): moderate depletion (11/01/23 1354)  Holiness Region (Muscle Loss): moderate depletion  Clavicle Bone Region (Muscle Loss): moderate depletion                    Fluid Accumulation (Malnutrition): mild (11/01/23 1354)        A minimum of two characteristics is recommended for diagnosis of either severe or non-severe malnutrition.    Chart Review    Reason Seen: follow-up    Malnutrition Screening Tool Results   Have you recently lost weight without trying?: No  Have you been eating poorly because of a decreased appetite?: No   MST Score: 0   Diagnosis:  Multifactorial acute hypoxemic respiratory failure  Community-acquired pneumonia contributing to above  Decompensated systolic congestive CHF contributing to above  LV thrombus  NSTEMI/type 2 demand ischemia 2/2 above  Left groin inguinal hernia  CHF, documented LVEF less than 20%, 10/2022 s/p ICD   CAD s/p PCI 10/2022  Essential hypertension   COPD, not on home oxygen   Lung cancer  Colon cancer, status post colectomy    Relevant Medical  History:  CHF with EF less than 20% status post ICD, CAD status post PCI 2022, essential hypertension, COPD, lung cancer status post XRT, colon cancer status post colectomy     Scheduled Medications:  mesalamine, 4 g, QHS    Continuous Infusions: none at this time     PRN Medications: acetaminophen, acetaminophen, albuterol-ipratropium, HYDROcodone-acetaminophen, melatonin, sodium chloride 0.9%, sodium chloride 0.9%    Calorie Containing IV Medications: no significant kcals from medications at this time    Recent Labs   Lab 10/30/23  1514 10/31/23  0244 10/31/23  1539 11/01/23  0419 11/01/23 2008 11/02/23  0411 11/02/23  1809 11/03/23  0500 11/03/23  1717 11/04/23  0544 11/04/23  1714 11/05/23  0509 11/05/23  1756 11/06/23  0330    144  --  139  --  134*  --  139  --  140  --   --   --   --    K 4.0 4.3  --  3.4*  --  3.3*  --  3.6  --  3.9  --   --   --   --    CALCIUM 8.4* 8.6*  --  7.9*  --  7.8*  --  8.4*  --  8.4*  --   --   --   --    PHOS  --   --   --   --   --   --   --   --   --  3.5  --   --   --   --    MG  --   --   --  1.30*  --  1.50*  --   --   --  1.70  --   --   --   --    CHLORIDE 108* 107  --  100  --  88*  --  88*  --  92*  --   --   --   --    CO2 24 28  --  27  --  33*  --  41*  --  39*  --   --   --   --    BUN 38.6* 33.1*  --  26.7*  --  21.0  --  24.7  --  20.4  --   --   --   --    CREATININE 1.05 1.10  --  0.87  --  0.89  --  1.27*  --  1.15  --   --   --   --    EGFRNORACEVR >60 >60  --  >60  --  >60  --  >60  --  >60  --   --   --   --    GLUCOSE 97 103  --  99  --  91  --  79*  --  102  --   --   --   --    BILITOT 1.4 1.3  --   --   --  1.6*  --  1.4  --   --   --   --   --   --    ALKPHOS 136 114  --   --   --  144  --  133  --   --   --   --   --   --    * 99*  --   --   --  55  --  45  --   --   --   --   --   --    AST 82* 62*  --   --   --  37*  --  30  --   --   --   --   --   --    ALBUMIN 3.1* 2.8*  --   --   --  2.7*  --  2.8*  --   --   --   --   --   --    HGB  "15.0 15.4   < > 15.4   < > 15.3   < > 14.4 13.2* 12.9* 13.5* 12.8* 12.8* 12.6*   HCT 47.6 48.0   < > 48.5   < > 47.7   < > 46.3 42.3 41.0* 42.9 41.2* 40.9* 42.1    < > = values in this interval not displayed.       Nutrition Orders:  Diet Adult Regular  Dietary nutrition supplements Boost Very High Calorie Nutritional Drink - Strawberry; BID    Appetite/Oral Intake: good/% of meals  Factors Affecting Nutritional Intake: none identified  Food/Adventism/Cultural Preferences: none reported  Food Allergies: none reported  Wound(s): no pressure injuries documented at this time, colostomy to RLQ documented in EMR  Last Bowel Movement: 23    Comments    23 Patient reports appetite is better/good, poor intake for 5-6 days prior, agreeable to oral supplement (any flavor), denies weight loss.    23 Patient reports appetite is fair, reports that he is not drinking Boost VHC but will start.    Anthropometrics    Height: 5' 8.9" (175 cm), Height Method: Stated  Last Weight: 73.4 kg (161 lb 13.1 oz) (10/31/23 1537), Weight Method: Bed Scale  BMI (Calculated): 24  BMI Classification: normal (BMI 18.5-24.9)        Ideal Body Weight (IBW), Male: 159.4 lb     % Ideal Body Weight, Male (lb): 101.52 %                 Usual Body Weight (UBW), k.5 kg-73.0 kg  % Usual Body Weight: 107.38     Usual Weight Provided By: patient    Wt Readings from Last 5 Encounters:   10/31/23 73.4 kg (161 lb 13.1 oz)     Weight Change(s) Since Admission: 4.9 kg weight change, suspect fluid-related  Wt Readings from Last 1 Encounters:   10/31/23 1537 73.4 kg (161 lb 13.1 oz)   10/31/23 1202 68.5 kg (151 lb)   10/30/23 1815 68.5 kg (151 lb)   Admit Weight: 68.5 kg (151 lb) (10/30/23 1815), Weight Method: Bed Scale    Estimated Needs    Weight Used For Calorie Calculations: 70 kg (154 lb 5.2 oz)  Energy Calorie Requirements (kcal): , 1.4 stress factor  Energy Need Method: [place]  Weight Used For Protein Calculations: 70 " kg (154 lb 5.2 oz)  Protein Requirements: 105 g, 1.5 g/kg  Fluid Requirements (mL): 1750, 25 ml/kg    Enteral Nutrition Patient not receiving enteral nutrition at this time.    Parenteral Nutrition Patient not receiving parenteral nutrition support at this time.    Evaluation of Received Nutrient Intake    Calories: not meeting estimated needs  Protein: not meeting estimated needs    Patient Education Not applicable.    Nutrition Diagnosis     PES: Moderate acute disease or injury related malnutrition related to catabolic illness as evidenced by less than or equal to 50% needs met for greater than or equal to 5 days, moderate fat depletion, moderate muscle depletion, and edema. (active)    Interventions/Goals     Intervention(s): general/healthful diet, commercial beverage, and collaboration with other providers    Goal (1): Meet greater than 75% of nutritional needs by follow-up. (goal progressing)  Goal (2): Consume % of oral supplements by follow-up. (goal not met)    Monitoring & Evaluation     Dietitian will monitor food and beverage intake, energy intake, weight, weight change, electrolyte/renal panel, glucose/endocrine profile, and gastrointestinal profile.    Nutrition Risk/Follow-Up: moderate (follow-up in 3-5 days)   Please consult if re-assessment needed sooner.

## 2023-11-07 LAB
HCT VFR BLD AUTO: 39.6 % (ref 42–52)
HCT VFR BLD AUTO: 41.7 % (ref 42–52)
HGB BLD-MCNC: 12 G/DL (ref 14–18)
HGB BLD-MCNC: 12.8 G/DL (ref 14–18)
PSYCHE PATHOLOGY RESULT: NORMAL

## 2023-11-07 PROCEDURE — 25000003 PHARM REV CODE 250: Performed by: HOSPITALIST

## 2023-11-07 PROCEDURE — 85014 HEMATOCRIT: CPT | Performed by: SURGERY

## 2023-11-07 PROCEDURE — 27000221 HC OXYGEN, UP TO 24 HOURS

## 2023-11-07 PROCEDURE — 25000003 PHARM REV CODE 250: Performed by: INTERNAL MEDICINE

## 2023-11-07 PROCEDURE — 25000003 PHARM REV CODE 250: Performed by: NURSE PRACTITIONER

## 2023-11-07 PROCEDURE — 21400001 HC TELEMETRY ROOM

## 2023-11-07 RX ADMIN — MESALAMINE 4 G: 4 ENEMA RECTAL at 08:11

## 2023-11-07 RX ADMIN — Medication 6 MG: at 08:11

## 2023-11-07 RX ADMIN — HYDROCODONE BITARTRATE AND ACETAMINOPHEN 1 TABLET: 10; 325 TABLET ORAL at 08:11

## 2023-11-07 RX ADMIN — HYDROCODONE BITARTRATE AND ACETAMINOPHEN 1 TABLET: 10; 325 TABLET ORAL at 03:11

## 2023-11-07 NOTE — PROGRESS NOTES
[facility] Hospital Medicine Progress Note        Chief Complaint: Inpatient Follow-up pneumonia    HPI:   63-year-old male with a history of CHF with EF less than 20% status post ICD, CAD status post PCI 2022, essential hypertension, COPD, lung cancer status post XRT, colon cancer status post colectomy who was recently admitted at [facility] for sepsis from community-acquired pneumonia but reported left 2 days ago against medical advice.  He presents to the ER tonight complaining of persistent dyspnea, cough with purulent green mucus production.  He also complains of left groin pain, has a chronic left groin hernia, but states it easily reducible and he has been told he is not a surgical candidate due to his comorbidities.     He arrived afebrile but tachycardic and mildly tachypneic, hemodynamically stable and requiring 4 L nasal cannula to maintain adequate sats.  Laboratory work showed leukocytosis of 15 K, mild transaminitis, a BNP of 3500 and a mildly elevated troponin of 0.085.  Influenza and COVID testing was negative.  Chest x-ray showed ill-defined bilateral mid/lower zone hazy infiltrates concerning for developing atypical process.  Echocardiogram revealed LV thrombus.  Cardiology started on heparin drip with Coumadin.  Will need lifelong Coumadin and follow up.    CT of the abdomen and pelvis demonstrated a blind ending:  Low level of mid transverse colon, localized severe edema does wall thickening of the proximal sigmoid colon left lower quadrant with mild regional standing, possible colitis, possible infiltrative mass as well as enhancement of the blind ending descending duodenum and left abdomen, possible colitis as well.  He was started on antibiotics for possible colitis.  Was on Zosyn for 7 days and discontinued.    Continued to have rectal bleeding Coumadin and heparin drip was held.  However hemoglobin remained stable.  Scheduled for a flex sig on 11/3  with GI.  General surgery was consulted however no indication for surgery at this time.  Patient was started on mesalamine enemas per GI for diversion colitis.  Patient continues to have some rectal bleeding.  Awaiting Colorectal surgery's recommendation.      Interval Hx:  Hemoglobin remaining stable.  Patient is a little frustrated and wants to go home.  In vital signs are stable.  Patient is tolerating his diet just fine.  Still waiting on colorectal surgery recommendation      Objective/physical exam:  General: In no acute distress, afebrile  Chest: Clear to auscultation bilaterally; on oxygen 2 L via nasal cannula  Heart: RRR, +S1, S2, no appreciable murmur  Abdomen: Soft, nontender, BS + hernia present  MSK: Warm, no lower extremity edema, no clubbing or cyanosis  Neurologic: Alert and oriented x4, Cranial nerve II-XII intact, Strength 5/5 in all 4 extremities     VITAL SIGNS: 24 HRS MIN & MAX LAST   Temp  Min: 97.5 °F (36.4 °C)  Max: 98.5 °F (36.9 °C) 98 °F (36.7 °C)   BP  Min: 102/67  Max: 115/80 109/77   Pulse  Min: 108  Max: 123  (!) 113   Resp  Min: 15  Max: 18 18   SpO2  Min: 98 %  Max: 100 % 99 %       Recent Labs   Lab 11/02/23  0411 11/02/23  1809 11/03/23  0500 11/03/23  1717 11/04/23  0544 11/04/23  1714 11/06/23  0330 11/06/23 2007 11/07/23  0336   WBC 25.39*  --  19.45*  --  15.91*  --   --   --   --    RBC 5.19  --  4.91  --  4.42*  --   --   --   --    HGB 15.3   < > 14.4   < > 12.9*   < > 12.6* 12.6* 12.8*   HCT 47.7   < > 46.3   < > 41.0*   < > 42.1 40.5* 41.7*   MCV 91.9  --  94.3*  --  92.8  --   --   --   --    MCH 29.5  --  29.3  --  29.2  --   --   --   --    MCHC 32.1*  --  31.1*  --  31.5*  --   --   --   --    RDW 14.9  --  15.0  --  14.8  --   --   --   --      --  167  --  190  --   --   --   --    MPV 12.6*  --  12.3*  --  12.3*  --   --   --   --     < > = values in this interval not displayed.         Recent Labs   Lab 11/01/23  0419 11/02/23  0411 11/03/23  0504  11/04/23  0544    134* 139 140   K 3.4* 3.3* 3.6 3.9   CO2 27 33* 41* 39*   BUN 26.7* 21.0 24.7 20.4   CREATININE 0.87 0.89 1.27* 1.15   CALCIUM 7.9* 7.8* 8.4* 8.4*   MG 1.30* 1.50*  --  1.70   ALBUMIN  --  2.7* 2.8*  --    ALKPHOS  --  144 133  --    ALT  --  55 45  --    AST  --  37* 30  --    BILITOT  --  1.6* 1.4  --             Microbiology Results (last 7 days)       Procedure Component Value Units Date/Time    Blood Culture [[id number]]  (Normal) Collected: 10/30/23 1514    Order Status: Completed Specimen: Blood Updated: 11/04/23 1601     CULTURE, BLOOD ([facility]) No Growth at 5 days    Blood Culture [[id number]]  (Normal) Collected: 10/30/23 1514    Order Status: Completed Specimen: Blood Updated: 11/04/23 1601     CULTURE, BLOOD ([facility]) No Growth at 5 days    Respiratory Culture [[id number]]  (Abnormal) Collected: 10/31/23 0626    Order Status: Completed Specimen: Sputum, Expectorated Updated: 11/02/23 0754     Respiratory Culture Moderate Yeast     Comment: with normal respiratory lashell        GRAM STAIN Quality 2+      Few Gram positive cocci      Few Gram Positive Rods      Few Yeast             Radiology:  CT Abdomen Pelvis With IV Contrast  Narrative: EXAMINATION:  CT ABDOMEN PELVIS WITH IV CONTRAST    CLINICAL HISTORY:  Bowel obstruction suspected;    TECHNIQUE:  Low dose axial images, sagittal and coronal reformations were obtained from the lung bases to the pubic symphysis following the IV administration of 100 mL of Omnipaque 350 .  Oral contrast given.  .  Automated exposure control used.    COMPARISON:  None.    FINDINGS:  Liver demonstrates normal enhancement.  There is a nonspecific appearing hypodensity in the right hepatic lobe measuring 6 mm on image 37 series 2.    Gallbladder contracted.  Possible cholelithiasis.  Biliary ductal system normal.  Pancreas normal.  Stomach, spleen, adrenal glands normal.    Kidneys mild renal core cortical thinning, prominent renal cortical scarring  bilaterally.  No hydronephrosis.    Aorta tapers normally.  Mild moderate atherosclerotic calcification.    Surgical changes consistent with partial colon resection.  There is a ascending colon colostomy in the right upper quadrant.  There appears to be a blind-ending loop of colon including the descending, sigmoid colon with the blind in near the mid transverse colon region.  There is a region suspected severe thickening of the mid to proximal sigmoid colon wall and enhancement of the descending colon proximally.  Moderate stranding noted in the left lower quadrant adjacent to the colon.  Surgical clips noted in the pelvis bilaterally.  Thickening in the presacral space abutting the rectum wall posteriorly possible treatment related changes.  Small bowel loops normal.  No significant ascites.  No significant lymphadenopathy in the abdomen or pelvis.    Left inguinal hernia containing multiple bowel loops extending nearly to the scrotum.  No definite evidence of incarceration or obstruction.    Bladder and rectum otherwise unremarkable.    Bones intact.    Severe centrilobular emphysematous changes the lung bases.  Mild moderate pleural effusion on the right, dependent atelectasis bilaterally.  Localized segmental area of infiltrate dependent right lower lobe.  Impression: Surgical changes consistent with partial colon resection.  There appears to be a a blind-ending colon loop at the level of the mid transverse colon in the upper abdomen.  There is a localized region of suspected severe edematous wall thickening of the proximal sigmoid colon left lower quadrant with mild regional stranding which could indicate colitis or possible infiltrative mass.  Enhancement of the blind-ending descending duodenum in the left abdomen could indicate colitis as well.    Surgical changes in the pelvis and likely treatment related thickening along the presacral space.    Left inguinal hernia containing bowel loops without  obstruction.    There is a 6 mm hypodensity in the liver, nonspecific.  Malignancy not excluded.    Other incidental findings as above.    Agree with macario.    Electronically signed by: Kaylynn Painting MD  Date:    11/02/2023  Time:    09:11      Scheduled Med:   mesalamine  4 g Rectal QHS          PRN Meds:  acetaminophen, acetaminophen, albuterol-ipratropium, HYDROcodone-acetaminophen, melatonin, sodium chloride 0.9%, sodium chloride 0.9%       Assessment/Plan:   Multifactorial acute hypoxemic respiratory failure  Community-acquired pneumonia contributing to above-respiratory culture growing few Gram-positive cocci and few Gram-positive rods.  Finished course with Zosyn  BRBPR:  Status post flex sig done on 11/03, likely secondary to diversion colitis.  General surgery consulted who suggest patient is a very high risk surgical candidate and would recommend continued medical management.  LV thrombus- discovered on echo  ARF/dehydration- likely secondary to dehydration and possible ATN due to lower blood pressures; resolved  Leukocytosis-reactive secondary to above  Nonspecific 6 mm hypodensity in the liver:  AFP normal; liver enzymes normal; need further workup with possible imaging studies outpatient to monitor  NSTEMI/type 2 demand ischemia 2/2 above  Left groin inguinal hernia- general surgery consulted and recommend no surgery since non-obstructive and poor surgical candidate  CHF, documented LVEF less than 20%, 10/2022 s/p ICD   CAD s/p PCI 10/2022  Essential hypertension   COPD, not on home oxygen   Lung cancer  Colon cancer, status post colectomy    -Rectal bleeding in a patient who has colostomy,   -CT shows colitis, likely diversion colitis  -Started on daily Mesalamine enemas  -discontinued Coreg and Lasix due to lower blood pressures; stable at this time  -general surgery recommends no surgery at this time and possible IR for embolization if continued bleeding  - d/c Zosyn; patient has received 7  days of Zosyn.  No further indication at this time  -patient also finish course of azithromycin for pneumonia  -continue to hold heparin and Coumadin at this time due to rectal bleeding; Patient has high risk LV thrombus, will need to be on Coumadin asap once bleeding controlled  -monitor hemoglobin every 12 hours  -will need to follow up with Colorectal surgery at discharge  -will need to add Coumadin when okay with GI  -labs in a.m.  -further recs based on clinical course    Valerie Carias DO  Department of Hospital Medicine  [facility]  11/07/2023       All diagnosis and differential diagnosis have been reviewed; assessment and plan has been documented; I have personally reviewed the labs and test results that are presently available; I have reviewed the patients medication list; I have reviewed the consulting providers response and recommendations. I have reviewed or attempted to review medical records based upon their availability    All of the patient's questions have been  addressed and answered. Patient's is agreeable to the above stated plan. I will continue to monitor closely and make adjustments to medical management as needed.  _____________________________________________________________________

## 2023-11-07 NOTE — PROGRESS NOTES
"Gastroenterology Progress Note    Subjective/Interval History:  11-4-23  Pt with flex sig yesterday with Dr. Rapp - proctitis with large clots - unable to advance scope much  Some mild oozing of blood overnight  Labs pending  Discussed with pt will start Mesalamine enema    11-5-23  Pt with no bleeding overnight - per pt and CNA  Hgb pending   Less rectal pain  Doing well with Mesalamine enema    11/6/23  Patient was seen earlier today. Reported to have some rectal bleeding. Hg/hct stable. On Mesalamine enemas. Patient would need f/u with colorectal surgery on discharge.     11/7/23  Patient was seen earlier today. No overt GI bleed. On Mesalamine enemas. Needs f/u with colorectal surgery for probable diversion colitis related to previous surgical interventions.     ROS:  12 point system reviewed and is negative except as noted in HPI     /67   Pulse 108   Temp 97.5 °F (36.4 °C) (Oral)   Resp 18   Ht 5' 8.9" (1.75 m)   Wt 73.4 kg (161 lb 13.1 oz)   SpO2 98%   BMI 23.97 kg/m²   Body mass index is 23.97 kg/m².    Physical Exam:  Constitutional:       General: He is not in acute distress.  HENT:      Head: Normocephalic.      Mouth/Throat:      Mouth: Mucous membranes are moist.      Pharynx: Oropharynx is clear.   Eyes:      Extraocular Movements: Extraocular movements intact.      Pupils: Pupils are equal, round, and reactive to light.   Cardiovascular:      Rate and Rhythm: Normal rate and regular rhythm.      Pulses: Normal pulses.      Heart sounds: Normal heart sounds. No murmur heard.  Pulmonary:      Effort: Pulmonary effort is normal. No respiratory distress.      Breath sounds: Wheezing and rhonchi present.      Comments: O2 2 L via NC  Abdominal:      General: Bowel sounds are normal. There is no distension.      Palpations: Abdomen is soft.      Tenderness: There is no abdominal tenderness. There is no guarding.      Hernia: A hernia is present.           Comments: Right Upper Quadrant " Colostomy; large protruding left inguinal hernia   Musculoskeletal:         General: No swelling.   Skin:     General: Skin is warm and dry.      Coloration: Skin is not jaundiced.   Neurological:      Mental Status: He is alert and oriented to person, place, and time.      Motor: Weakness present.   Psychiatric:         Mood and Affect: Mood normal.         Behavior: Behavior normal.     Labs:  Recent Results (from the past 48 hour(s))   Hemoglobin and Hematocrit    Collection Time: 11/05/23  5:09 AM   Result Value Ref Range    Hgb 12.8 (L) 14.0 - 18.0 g/dL    Hct 41.2 (L) 42.0 - 52.0 %   Hemoglobin and Hematocrit    Collection Time: 11/05/23  5:56 PM   Result Value Ref Range    Hgb 12.8 (L) 14.0 - 18.0 g/dL    Hct 40.9 (L) 42.0 - 52.0 %   Hemoglobin and Hematocrit    Collection Time: 11/06/23  3:30 AM   Result Value Ref Range    Hgb 12.6 (L) 14.0 - 18.0 g/dL    Hct 42.1 42.0 - 52.0 %   Hemoglobin and Hematocrit    Collection Time: 11/06/23  8:07 PM   Result Value Ref Range    Hgb 12.6 (L) 14.0 - 18.0 g/dL    Hct 40.5 (L) 42.0 - 52.0 %       Imaging:  CT Abdomen Pelvis With IV Contrast    Result Date: 11/2/2023  EXAMINATION: CT ABDOMEN PELVIS WITH IV CONTRAST CLINICAL HISTORY: Bowel obstruction suspected; TECHNIQUE: Low dose axial images, sagittal and coronal reformations were obtained from the lung bases to the pubic symphysis following the IV administration of 100 mL of Omnipaque 350 .  Oral contrast given.  .  Automated exposure control used. COMPARISON: None. FINDINGS: Liver demonstrates normal enhancement.  There is a nonspecific appearing hypodensity in the right hepatic lobe measuring 6 mm on image 37 series 2. Gallbladder contracted.  Possible cholelithiasis.  Biliary ductal system normal.  Pancreas normal.  Stomach, spleen, adrenal glands normal. Kidneys mild renal core cortical thinning, prominent renal cortical scarring bilaterally.  No hydronephrosis. Aorta tapers normally.  Mild moderate  atherosclerotic calcification. Surgical changes consistent with partial colon resection.  There is a ascending colon colostomy in the right upper quadrant.  There appears to be a blind-ending loop of colon including the descending, sigmoid colon with the blind in near the mid transverse colon region.  There is a region suspected severe thickening of the mid to proximal sigmoid colon wall and enhancement of the descending colon proximally.  Moderate stranding noted in the left lower quadrant adjacent to the colon.  Surgical clips noted in the pelvis bilaterally.  Thickening in the presacral space abutting the rectum wall posteriorly possible treatment related changes.  Small bowel loops normal.  No significant ascites.  No significant lymphadenopathy in the abdomen or pelvis. Left inguinal hernia containing multiple bowel loops extending nearly to the scrotum.  No definite evidence of incarceration or obstruction. Bladder and rectum otherwise unremarkable. Bones intact. Severe centrilobular emphysematous changes the lung bases.  Mild moderate pleural effusion on the right, dependent atelectasis bilaterally.  Localized segmental area of infiltrate dependent right lower lobe.     Surgical changes consistent with partial colon resection.  There appears to be a a blind-ending colon loop at the level of the mid transverse colon in the upper abdomen.  There is a localized region of suspected severe edematous wall thickening of the proximal sigmoid colon left lower quadrant with mild regional stranding which could indicate colitis or possible infiltrative mass.  Enhancement of the blind-ending descending duodenum in the left abdomen could indicate colitis as well. Surgical changes in the pelvis and likely treatment related thickening along the presacral space. Left inguinal hernia containing bowel loops without obstruction. There is a 6 mm hypodensity in the liver, nonspecific.  Malignancy not excluded. Other incidental  findings as above. Agree with macario. Electronically signed by: Kaylynn Painting MD Date:    11/02/2023 Time:    09:11    Echo    Result Date: 10/31/2023    Left Ventricle: The left ventricle is severely dilated. Severe global hypokinesis present. There is severely reduced systolic function with a visually estimated ejection fraction of 15 - 20%. There are mural thrombus located on the anterior lateral and inferior, apical inferior. Grade III diastolic dysfunction.   Right Ventricle: Systolic function is borderline low.   Left Atrium: Left atrium is moderately dilated.   Mitral Valve: There is mild mitral annular calcification present. There is moderate regurgitation.   Tricuspid Valve: There is mild to moderate regurgitation. The estimated PA systolic pressure is at least 52 mmHg.   Pulmonary Artery: There is moderate pulmonary hypertension.     X-Ray Chest PA And Lateral    Result Date: 10/30/2023  EXAMINATION XR CHEST PA AND LATERAL CLINICAL HISTORY Shortness of breath; TECHNIQUE A total of 2 images submitted of the chest. COMPARISON None available at the time of initial interpretation. FINDINGS Lines/tubes/devices: Right chest wall subcutaneous port is present, catheter terminating at the upper to mid SVC region.  Single lead left chest wall pacemaker/ICD also noted.  Multiple ECG leads overlie the chest. The cardiomediastinal silhouette and central pulmonary vasculature are unremarkable contour and size.  The trachea is midline.  Ill-defined hazy opacification of the bilateral mid and lower lung zones is appreciated, with no organized lobar consolidation identified.  Small right pleural effusion is suspected.  There is no convincing pneumothorax. There is no acute osseous or extrathoracic abnormality. IMPRESSION 1. Ill-defined bilateral mid/lower zone hazy infiltrates, developing atypical infectious process not excluded. 2. Suspected small right pleural effusion. Electronically signed by: Azeem Stanley  Date:    10/30/2023 Time:    16:52     Assessment/Plan:  Endoscopy:    Last 2 years ago with MD Cobian through ostomy and rectum. Patient does not think he has had an EGD before     Assessment/Plan:  BRBPR- moderate amount x2 episodes at least. Continues bleeding now.   Heparin gtt on hold- recommend continue holding until source of bleeding identified  Flexible Sigmoidoscopy Friday  2. Liver Lesion noted on CT- if further investigation needed, would recommend a CT triple phase for liver lesion or MRI Abdomen after acute issues addressed.               A. AST very mildly elevated; T bili 1.6; will order AFP    Doing well with mesalamine enemas    AFP okay.   Transfuse as needed     Gamal Rapp MD

## 2023-11-07 NOTE — PHYSICIAN QUERY
PT Name: Ye Vergara  MR #: [id number]    DOCUMENTATION CLARIFICATION     CDS/: Eben Padilla RN, CCDS               Contact information:    [email or web address]       This form is a permanent document in the medical record.     Query Date: November 7, 2023    By submitting this query, we are merely seeking further clarification of documentation.. Please utilize your independent clinical judgment when addressing the question(s) below.    The medical record contains the following:   Indicators  Supporting Clinical Findings Location in Medical Record   x Registered Dietician Diagnosis Malnutrition Level: moderate (11/01/23 2164)    PES: Moderate acute disease or injury related malnutrition related to catabolic illness as evidenced by less than or equal to 50% needs met for greater than or equal to 5 days, moderate fat depletion, moderate muscle depletion, and edema. (new)        11/1  Nutrition   x Energy Intake Energy Intake (Malnutrition): less than or equal to 50% for greater than or equal to 5 days      11/1  Nutrition    Weight Loss recently lost weight without trying?: No     11/1  Nutrition   x Fat Loss Subcutaneous Fat ; moderate depletion to the :  Orbital Region ; Upper Arm Region        11/1  Nutrition   x Muscle Loss Muscle mass- moderate depletion to the :   Temple region and clavicle bone region      11/1  Nutrition   x Edema/Fluid Accumulation Mild fluid accumulation     11/1  Nutrition    Reduced  Strength (by dynamometer)     x Weight, BMI, Usual Body Weight Last Weight: 73.4 kg (161 lb 13.1 oz) (10/31/23 1537), Weight Method: Bed Scale  BMI (Calculated): 24     11/1  Nutrition    Delayed Wound Healing     x Acute or Chronic Illness Multifactorial acute hypoxemic respiratory failure  Community-acquired pneumonia contributing to above  Decompensated systolic congestive CHF contributing to above      history of CHF with EF less than 20% status post ICD, CAD status post PCI 2022,  essential hypertension, COPD, lung cancer ....     11/1  Nutrition          10/30  H&P: HM    Social or Environmental Circumstances     x Treatment Add Boost Very High Calorie (provides 530 kcal, 22 g protein per serving) BID. 11/1  Nutrition     Other       Academy of Nutrition and Dietetics (Academy) and the American Society for Parenteral and Enteral Nutrition (A.S.P.E.N.) Clinical Characteristics to support Malnutrition      Criteria for mild malnutrition is defined as 1 characteristic outlined above within the established moderate or severe parameters.  A minimum of 2 out of the 6 characteristics noted above are recommended for a diagnosis of moderate or severe malnutrition.  Chronic illness/injury is a disease/condition lasting 3 months or longer.    The noted clinical guidelines are only system guidelines and do not replace the providers clinical judgment.    Provider, please specify diagnosis or diagnoses associated with above clinical findings.    [ x ] Moderate Malnutrition - a minimum of 2 of the 6 moderate malnutrition characteristics noted above    [  ] Other Nutritional Diagnosis (please specify): _______   [  ] Other (please specify):__________________     Please document in your progress notes daily for the duration of treatment until resolved and  include in your discharge summary.      References:    ESTEBAN Munoz., & UZMA العلي. (2022, April). Assessment and management of anorexia and cachexia in palliative care. Retrieved May 23, 2022, from [email or web address]     DANAE Arango, PhD, RD, KESHIA, RUBI Cline, PhD, RN, EDELMIRA Moss MD, PhD, Екатерина SINGH A., MS, RD, CNSC, GAVIN Guevara, MS, RD, The Academy Malnutrition Work Group, The A.S.P.E.N. Board of Directors. (2012). Consensus Statement: Academy of Nutrition and Dietetics and American Society for Parenteral and Enteral Nutrition:  Characteristics Recommended for the Identification and Documentation of Adult Malnutrition (Undernutrition). Journal of Parenteral and Enteral Nutrition, 36(3), 275-863. doi:10.1177/4956077291863120     Form No. [id number]

## 2023-11-08 VITALS
WEIGHT: 161.81 LBS | SYSTOLIC BLOOD PRESSURE: 112 MMHG | BODY MASS INDEX: 23.97 KG/M2 | TEMPERATURE: 99 F | HEART RATE: 116 BPM | OXYGEN SATURATION: 99 % | DIASTOLIC BLOOD PRESSURE: 73 MMHG | HEIGHT: 69 IN | RESPIRATION RATE: 18 BRPM

## 2023-11-08 LAB
HCT VFR BLD AUTO: 37.3 % (ref 42–52)
HGB BLD-MCNC: 11.4 G/DL (ref 14–18)

## 2023-11-08 PROCEDURE — 97162 PT EVAL MOD COMPLEX 30 MIN: CPT

## 2023-11-08 PROCEDURE — 27000221 HC OXYGEN, UP TO 24 HOURS

## 2023-11-08 PROCEDURE — 85014 HEMATOCRIT: CPT | Performed by: SURGERY

## 2023-11-08 RX ORDER — MESALAMINE 4 G/60ML
4 SUSPENSION RECTAL NIGHTLY
Qty: 1800 ML | Refills: 11 | Status: SHIPPED | OUTPATIENT
Start: 2023-11-08 | End: 2024-11-07

## 2023-11-08 NOTE — PLAN OF CARE
Problem: Physical Therapy  Goal: Physical Therapy Goal  Description: Goals to be met by: 23     Patient will increase functional independence with mobility by performin. Gait  x 150 feet with Modified Tucker using Rolling Walker.   2. Ascend/descend 2 stair with bilateral Handrails Modified Tucker using Rolling Walker.     Outcome: Ongoing, Progressing

## 2023-11-08 NOTE — DISCHARGE SUMMARY
[facility] Hospital Medicine Discharge Summary    Admit Date: 10/30/2023  Discharge Date and Time: 11/8/20236:41 AM  Admitting Physician: Hospitalist team   Discharging Physician: David Goldberg MD.  Primary Care Physician: Aaron Weeks MD      Discharge Diagnoses:  Acute hypoxemic respiratory failure, resolved   Community-acquired pneumonia, completed antibiotics   Hematochezia  LV thrombus   Acute renal failure, resolved   NSTEMI, type 2 secondary to demand ischemia  Left groin inguinal hernia, chronic   Chronic systolic heart failure, EF less than 20%, ICD  Coronary artery disease  Essential hypertension  COPD, compensated   History of lung cancer and colon cancer    Hospital Course:   63-year-old male with a history of CHF with EF less than 20% status post ICD, CAD status post PCI 2022, essential hypertension, COPD, lung cancer status post XRT, colon cancer status post colectomy who was recently admitted at [facility] for sepsis from community-acquired pneumonia but reported left 2 days ago against medical advice.  He presents to the ER tonight complaining of persistent dyspnea, cough with purulent green mucus production.  He also complains of left groin pain, has a chronic left groin hernia, but states it easily reducible and he has been told he is not a surgical candidate due to his comorbidities.     He arrived afebrile but tachycardic and mildly tachypneic, hemodynamically stable and requiring 4 L nasal cannula to maintain adequate sats.  Laboratory work showed leukocytosis of 15 K, mild transaminitis, a BNP of 3500 and a mildly elevated troponin of 0.085.  Influenza and COVID testing was negative.  Chest x-ray showed ill-defined bilateral mid/lower zone hazy infiltrates concerning for developing atypical process.  Echocardiogram revealed LV thrombus.  Cardiology started on heparin drip with Coumadin.  Will need lifelong Coumadin and follow up.     CT of the  abdomen and pelvis demonstrated a blind ending:  Low level of mid transverse colon, localized severe edema does wall thickening of the proximal sigmoid colon left lower quadrant with mild regional standing, possible colitis, possible infiltrative mass as well as enhancement of the blind ending descending duodenum and left abdomen, possible colitis as well.  He was started on antibiotics for possible colitis.  Was on Zosyn for 7 days and discontinued.     Continued to have rectal bleeding Coumadin and heparin drip was held.  However hemoglobin remained stable.  Scheduled for a flex sig on 11/3 with GI.  General surgery was consulted however no indication for surgery at this time.  Patient was started on mesalamine enemas per GI for diversion colitis.  Patient continues to have some rectal bleeding.  Patient did not have a chance to see colorectal surgery in the hospital but this can be done as an outpatient.  Add an extended conversation with him on the day of discharge.  He was tired of being stuck and no long on to be in the hospital.  He understands the risk of leaving.  States that he will follow up as a clinic.  His bleeding was not that bad and it his improved with the addition of the mesalamine enemas.  Will send him with a prescription of this home and try to arrange for him outpatient cardio rectal surgery follow-up.  His vital signs have remained stable.  He is tolerating p.o..  Has normal ostomy output.  We discussed the potential of holding his Coumadin but with his known LV thrombus that he would be at an increased risk of stroke.  States that the bleeding was not that bad and that he was just go back on his Coumadin until he can see the surgeon.  I did have conversation with the patient regarding his goals of care.  He does remain full code at this time but he is tired of being in the hospital.  Discussed with him that he may benefit from conversations with palliative care as an outpatient.  Will ask  "case management to arrange for a informational visit.    Vitals:  Blood pressure 103/69, pulse 104, temperature 97.5 °F (36.4 °C), temperature source Oral, resp. rate 18, height 5' 8.9" (1.75 m), weight 73.4 kg (161 lb 13.1 oz), SpO2 98 %..    Physical Exam:  Awake, Alert, Oriented x 3, No new focal Neurologic deficit, Normal Affect  NC/AT, PERRLA, EOMI  Supple neck, no JVD, No cervical lymphadenopathy  Symmetrical chest, Good air entry bilaterally. No rhonchi, wheezes, crackles appreciated  RRR, No gallop, rub or murmur  +ve Bowel sounds x4, Abd soft and non tender, no rebound, guarding or rigidity  No Cyanosis, cludding or edema, No new rash or bruises    Procedures Performed: No admission procedures for hospital encounter.     Significant Diagnostic Studies: See Full reports for all details  No results displayed because visit has over 200 results.           Microbiology Results (last 7 days)       Procedure Component Value Units Date/Time    Blood Culture [[id number]]  (Normal) Collected: 10/30/23 1514    Order Status: Completed Specimen: Blood Updated: 11/04/23 1601     CULTURE, BLOOD ([facility]) No Growth at 5 days    Blood Culture [[id number]]  (Normal) Collected: 10/30/23 1514    Order Status: Completed Specimen: Blood Updated: 11/04/23 1601     CULTURE, BLOOD ([facility]) No Growth at 5 days    Respiratory Culture [[id number]]  (Abnormal) Collected: 10/31/23 0626    Order Status: Completed Specimen: Sputum, Expectorated Updated: 11/02/23 0754     Respiratory Culture Moderate Yeast     Comment: with normal respiratory lashell        GRAM STAIN Quality 2+      Few Gram positive cocci      Few Gram Positive Rods      Few Yeast             CT Abdomen Pelvis With IV Contrast    Result Date: 11/2/2023  EXAMINATION: CT ABDOMEN PELVIS WITH IV CONTRAST CLINICAL HISTORY: Bowel obstruction suspected; TECHNIQUE: Low dose axial images, sagittal and coronal reformations were obtained from the lung bases to the pubic symphysis following " the IV administration of 100 mL of Omnipaque 350 .  Oral contrast given.  .  Automated exposure control used. COMPARISON: None. FINDINGS: Liver demonstrates normal enhancement.  There is a nonspecific appearing hypodensity in the right hepatic lobe measuring 6 mm on image 37 series 2. Gallbladder contracted.  Possible cholelithiasis.  Biliary ductal system normal.  Pancreas normal.  Stomach, spleen, adrenal glands normal. Kidneys mild renal core cortical thinning, prominent renal cortical scarring bilaterally.  No hydronephrosis. Aorta tapers normally.  Mild moderate atherosclerotic calcification. Surgical changes consistent with partial colon resection.  There is a ascending colon colostomy in the right upper quadrant.  There appears to be a blind-ending loop of colon including the descending, sigmoid colon with the blind in near the mid transverse colon region.  There is a region suspected severe thickening of the mid to proximal sigmoid colon wall and enhancement of the descending colon proximally.  Moderate stranding noted in the left lower quadrant adjacent to the colon.  Surgical clips noted in the pelvis bilaterally.  Thickening in the presacral space abutting the rectum wall posteriorly possible treatment related changes.  Small bowel loops normal.  No significant ascites.  No significant lymphadenopathy in the abdomen or pelvis. Left inguinal hernia containing multiple bowel loops extending nearly to the scrotum.  No definite evidence of incarceration or obstruction. Bladder and rectum otherwise unremarkable. Bones intact. Severe centrilobular emphysematous changes the lung bases.  Mild moderate pleural effusion on the right, dependent atelectasis bilaterally.  Localized segmental area of infiltrate dependent right lower lobe.     Surgical changes consistent with partial colon resection.  There appears to be a a blind-ending colon loop at the level of the mid transverse colon in the upper abdomen.   There is a localized region of suspected severe edematous wall thickening of the proximal sigmoid colon left lower quadrant with mild regional stranding which could indicate colitis or possible infiltrative mass.  Enhancement of the blind-ending descending duodenum in the left abdomen could indicate colitis as well. Surgical changes in the pelvis and likely treatment related thickening along the presacral space. Left inguinal hernia containing bowel loops without obstruction. There is a 6 mm hypodensity in the liver, nonspecific.  Malignancy not excluded. Other incidental findings as above. Agree with nighthawk. Electronically signed by: Kaylynn Painting MD Date:    11/02/2023 Time:    09:11    Echo    Result Date: 10/31/2023    Left Ventricle: The left ventricle is severely dilated. Severe global hypokinesis present. There is severely reduced systolic function with a visually estimated ejection fraction of 15 - 20%. There are mural thrombus located on the anterior lateral and inferior, apical inferior. Grade III diastolic dysfunction.   Right Ventricle: Systolic function is borderline low.   Left Atrium: Left atrium is moderately dilated.   Mitral Valve: There is mild mitral annular calcification present. There is moderate regurgitation.   Tricuspid Valve: There is mild to moderate regurgitation. The estimated PA systolic pressure is at least 52 mmHg.   Pulmonary Artery: There is moderate pulmonary hypertension.     X-Ray Chest PA And Lateral    Result Date: 10/30/2023  EXAMINATION XR CHEST PA AND LATERAL CLINICAL HISTORY Shortness of breath; TECHNIQUE A total of 2 images submitted of the chest. COMPARISON None available at the time of initial interpretation. FINDINGS Lines/tubes/devices: Right chest wall subcutaneous port is present, catheter terminating at the upper to mid SVC region.  Single lead left chest wall pacemaker/ICD also noted.  Multiple ECG leads overlie the chest. The cardiomediastinal silhouette and  central pulmonary vasculature are unremarkable contour and size.  The trachea is midline.  Ill-defined hazy opacification of the bilateral mid and lower lung zones is appreciated, with no organized lobar consolidation identified.  Small right pleural effusion is suspected.  There is no convincing pneumothorax. There is no acute osseous or extrathoracic abnormality. IMPRESSION 1. Ill-defined bilateral mid/lower zone hazy infiltrates, developing atypical infectious process not excluded. 2. Suspected small right pleural effusion. Electronically signed by: Azeem Stanley Date:    10/30/2023 Time:    16:52  - pulls last radiology orders        Medication List        START taking these medications      mesalamine 4 gram/60 mL Enem  Commonly known as: ROWASA  Place 60 mLs (4 g total) rectally every evening.               Where to Get Your Medications        These medications were sent to [facility] [address] [address]      Phone: [phone]   mesalamine 4 gram/60 mL Enem          Explained in detail to the patient about the discharge plan, medications, and follow-up visits. Pt understands and agrees with the treatment plan  Discharged Condition: stable  Diet:  Disposition:    Medications Per DC med rec  Activities as tolerated  Follow up with your PCP in 2 wks   For further questions contact hospitalist office    Discharge time 33 minutes    For worsening symptoms, chest pain, shortness of breath, increased abdominal pain, high grade fever, stroke or stroke like symptoms, immediately go to the nearest Emergency Room or call 911 as soon as possible.        David Gayle M.D, on 11/8/2023. at 6:41 AM.

## 2023-11-08 NOTE — PLAN OF CARE
11/08/23 1141   Final Note   Assessment Type Final Discharge Note   Anticipated Discharge Disposition Home-Health   Post-Acute Status   Post-Acute Authorization Home Health   Home Health Status Set-up Complete/Auth obtained     Contacted home O2 company [employer] spoke to Dieudonne regarding oxygen tank empty and needing one to go home. Company will send tank later this afternoon. Set up home health with [place].

## 2023-11-08 NOTE — PT/OT/SLP EVAL
Physical Therapy Evaluation    Patient Name:  Ye Vergara   MRN:  [id number]    Recommendations:     Discharge therapy intensity:  PT recommendation would be for moderate intensity therapy services due to severity of deficits and limited CG assistance at home. Pt adamant about return home today, therefore PT recommends RW and low intensity services to improve safety at home.    Discharge Equipment Recommendations: walker, rolling   Barriers to discharge: Decreased caregiver support, Impaired mobility, and Ongoing medical needs    Assessment:     Ye Vergara is a 63 y.o. male admitted with a medical diagnosis of CAP (community acquired pneumonia).  He presents with the following impairments/functional limitations: impaired endurance, impaired cardiopulmonary response to activity. Pt with limited functional endurance with HR 120bpm resting and >135bpm with minimal exertion. He is requiring 4L NC to maintain sats >90% at rest and easily desaturates with mobility. He admits to limited CG support at home, and is high risk for fall and rehospitalization. Pt declines placement and has requested discharge today. Pt would benefit from RW to improve stability during mobility and home health services to assess home needs and for ongoing interventions.    Rehab Prognosis: Fair; patient would benefit from acute skilled PT services to address these deficits and reach maximum level of function.    Recent Surgery: Procedure(s) (LRB):  SIG (Left) 5 Days Post-Op    Plan:     During this hospitalization, patient to be seen 5 x/week to address the identified rehab impairments via gait training, therapeutic activities, therapeutic exercises and progress toward the following goals:    Plan of Care Expires:  12/08/23    Subjective     Chief Complaint: ready to go home  Patient/Family Comments/goals: return home today  Pain/Comfort:  Pain Rating 1: 0/10    Patients cultural, spiritual, Evangelical conflicts given the current situation:   "    Living Environment:  Pt lives in single level home with 2 steps to enter with railings present. He lives with his 90year old mother. He reports aide present for mother but not able to assist him. He has a tub/shower combo with seat available  Prior to admission, patients level of function was Independent.  Equipment used at home: oxygen.  DME owned (not currently used): none.  Upon discharge, patient will have assistance from unknown, son on his way to transport pt home but pt does not report he is able to provide further assistance.    Objective:     Communicated with nurse prior to session.  Patient found HOB elevated with oxygen  upon PT entry to room.    General Precautions: Standard, fall  Orthopedic Precautions:N/A   Braces: N/A  Respiratory Status: Nasal cannula, flow 4 L/min  Oxygen Saturation: 97% resting, 84% with exertion      Exams:  Cognitive Exam:  Patient is oriented to Person, Place, Time, and Situation  RLE ROM: WFL  RLE Strength: WFL  LLE ROM: WFL  LLE Strength: WFL  Skin integrity: Visible skin intact      Functional Mobility:  Bed Mobility:     Supine to Sit: modified independence  Sit to Supine: modified independence  Transfers:     Sit to Stand:  stand by assistance with rolling walker  Gait: 12ft x 2 trials with RW, SBA. Flexed posture      AM-PAC 6 CLICK MOBILITY  Total Score:23       Treatment & Education:  Pt requesting to use commode before attempting mobility in hallway. Following void on commode pt reports "I don't think I can do anymore". Pt fatigue limits mobility and pt is at increased risk of falls.    Patient provided with verbal education and demonstrations education regarding PT POC, safety precautions, controlled movements.  Understanding was verbalized, however additional teaching warranted.     Patient left HOB elevated with all lines intact, call button in reach, and nurse notified. Also notified nurse and CM that oxygen tank in room empty.    GOALS:   Multidisciplinary " Problems       Physical Therapy Goals          Problem: Physical Therapy    Goal Priority Disciplines Outcome Goal Variances Interventions   Physical Therapy Goal     PT, PT/OT Ongoing, Progressing     Description: Goals to be met by: 23     Patient will increase functional independence with mobility by performin. Gait  x 150 feet with Modified [place] using Rolling Walker.   2. Ascend/descend 2 stair with bilateral Handrails Modified [place] using Rolling Walker.                          History:     Past Medical History:   Diagnosis Date    Cancer     CHF (congestive heart failure)     COPD (chronic obstructive pulmonary disease)     Coronary artery disease        Past Surgical History:   Procedure Laterality Date    ABDOMINAL SURGERY      BACK SURGERY      CARDIAC SURGERY      COLON SURGERY      SIGMOIDOSCOPY, WITH BIOPSY Left 11/3/2023    Procedure: SIG;  Surgeon: Gamal Rapp MD;  Location: [facility];  Service: Gastroenterology;  Laterality: Left;  via rectum only (not colostomy)       Time Tracking:     PT Received On: 23  PT Start Time: 1050     PT Stop Time: 1111  PT Total Time (min): 21 min     Billable Minutes: Evaluation moderate      2023

## 2023-11-09 ENCOUNTER — PATIENT OUTREACH (OUTPATIENT)
Dept: ADMINISTRATIVE | Facility: CLINIC | Age: 63
End: 2023-11-09
Payer: MEDICARE

## 2023-11-09 RX ORDER — HYDROCODONE BITARTRATE AND ACETAMINOPHEN 10; 325 MG/1; MG/1
1 TABLET ORAL EVERY 6 HOURS PRN
COMMUNITY
Start: 2023-01-01

## 2023-11-09 NOTE — PROGRESS NOTES
C3 nurse spoke with Ye Vergara for a TCC post hospital discharge follow up call. The patient has a scheduled [facility] appointment with Aaron Weeks MD (PCP) 11/13/23. Patient also scheduled to see Delta Shaver MD (cardiology) 11/22/23 @ 2:30pm, and Campbell Paredes MD (colorectal surgery) 11/30/23 @ 1:30pm. Patient noted that he is a Jr, but that is not noted in chart. C3 nurse asked patient to have this updated at next [facility] appointment with Dr. Paredes, and he voiced understanding.

## (undated) DEVICE — KIT CANIST SUCTION 1200CC

## (undated) DEVICE — ADAPTER DUAL NSL LUER M-M 7FT

## (undated) DEVICE — UNDERPAD DISPOSABLE 30X30IN

## (undated) DEVICE — SOL IRRI STRL WATER 1000ML

## (undated) DEVICE — BLOCK BLOX BITE DENT RIM 54FR

## (undated) DEVICE — TIP SUCTION YANKAUER

## (undated) DEVICE — KIT SURGICAL COLON .25 1.1OZ

## (undated) DEVICE — COLLECTION SPECIMEN NEPTUNE